# Patient Record
Sex: FEMALE | Race: WHITE | NOT HISPANIC OR LATINO | Employment: FULL TIME | URBAN - METROPOLITAN AREA
[De-identification: names, ages, dates, MRNs, and addresses within clinical notes are randomized per-mention and may not be internally consistent; named-entity substitution may affect disease eponyms.]

---

## 2017-05-22 ENCOUNTER — OFFICE VISIT (OUTPATIENT)
Dept: ENDOCRINOLOGY | Facility: CLINIC | Age: 32
End: 2017-05-22

## 2017-05-22 VITALS
BODY MASS INDEX: 47.09 KG/M2 | OXYGEN SATURATION: 99 % | SYSTOLIC BLOOD PRESSURE: 145 MMHG | WEIGHT: 293 LBS | DIASTOLIC BLOOD PRESSURE: 84 MMHG | HEART RATE: 80 BPM | HEIGHT: 66 IN

## 2017-05-22 DIAGNOSIS — E66.01 MORBID OBESITY, UNSPECIFIED OBESITY TYPE (H): Primary | ICD-10-CM

## 2017-05-22 RX ORDER — PHENTERMINE HYDROCHLORIDE 37.5 MG/1
0.5 TABLET ORAL EVERY MORNING
Qty: 15 TABLET | Refills: 3 | Status: SHIPPED | OUTPATIENT
Start: 2017-05-22 | End: 2017-06-29

## 2017-05-22 RX ORDER — PNV NO.95/FERROUS FUM/FOLIC AC 28MG-0.8MG
TABLET ORAL
COMMUNITY
Start: 2016-04-18 | End: 2017-06-29

## 2017-05-22 NOTE — NURSING NOTE
"Chief Complaint   Patient presents with     Weight Problem     NMWM       Vitals:    05/22/17 0854   BP: 145/84   Pulse: 80   SpO2: 99%   Weight: (!) 362 lb 9.6 oz   Height: 5' 6\"       Body mass index is 58.53 kg/(m^2).    Dio Schmitt CMA                     "

## 2017-05-22 NOTE — LETTER
Date:May 24, 2017      Patient was self referred, no letter generated. Do not send.        [facility]

## 2017-05-22 NOTE — PATIENT INSTRUCTIONS
See Sarah Negron in 1 month for return MW  See dietitian in 1 month      MEDICATION STARTED AT THIS APPOINTMENT    We are starting Phentermine. Take one tablet in the morning.  Call the nurse at [phone] if you have any questions or concerns. (Do not stop taking it if you don't think it's working. For some people it works without them knowing it.)    Phentermine is being prescribed because you identified hunger as one of the main causes for your extra weight.      Our patients on Phentermine find that they:    >feel less hunger    >find it easier to push the plate away   >have an easier time eating less    For some of our patients, these feelings are very real and immediate. For other patients, the feelings are less obvious. They don't feel much of a change but find they've lost weight. Like all weight loss medications, Phentermine  works best when you help it work. This means:  1. Having less tempting high calorie (fattening) food around the house or office. (For people with strong cravings this is very important.)   2. Staying away from situations or people that may trigger your cravings .   3. Eating out only one time or less each week.  4. Eating your meals at a table with the TV or computer off.    Side-effects. Phentermine is generally well tolerated. The main side-effects we see are feelings of racing pulse or rapid heart beat. Some people can get an elevated blood pressure. Because of this we may have you come back within a week or so of starting the medication for a blood pressure check.         In order to get refills of this or any medication we prescribe you must be seen in the medical weight mgmt clinic every 2-3 months. Please have your pharmacy fax a refill request to [phone].
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
## 2017-05-22 NOTE — MR AVS SNAPSHOT
After Visit Summary   5/22/2017    Melody Ramos    MRN: [id number]           Patient Information     Date Of Birth          1985        Visit Information        Provider Department      5/22/2017 9:00 AM Sarah Negron PA-C [facility]        Today's Diagnoses     Morbid obesity, unspecified obesity type (H)    -  1      Care Instructions    See Sarah Negron in 1 month for return MWM  See dietitian in 1 month      MEDICATION STARTED AT THIS APPOINTMENT    We are starting Phentermine. Take one tablet in the morning.  Call the nurse at [phone] if you have any questions or concerns. (Do not stop taking it if you don't think it's working. For some people it works without them knowing it.)    Phentermine is being prescribed because you identified hunger as one of the main causes for your extra weight.      Our patients on Phentermine find that they:    >feel less hunger    >find it easier to push the plate away   >have an easier time eating less    For some of our patients, these feelings are very real and immediate. For other patients, the feelings are less obvious. They don't feel much of a change but find they've lost weight. Like all weight loss medications, Phentermine  works best when you help it work. This means:  1. Having less tempting high calorie (fattening) food around the house or office. (For people with strong cravings this is very important.)   2. Staying away from situations or people that may trigger your cravings .   3. Eating out only one time or less each week.  4. Eating your meals at a table with the TV or computer off.    Side-effects. Phentermine is generally well tolerated. The main side-effects we see are feelings of racing pulse or rapid heart beat. Some people can get an elevated blood pressure. Because of this we may have you come back within a week or so of starting the medication for a blood pressure check.         In order to  "get refills of this or any medication we prescribe you must be seen in the medical weight mgmt clinic every 2-3 months. Please have your pharmacy fax a refill request to [phone].            Follow-ups after your visit        Follow-up notes from your care team     Return in 4 weeks (on 6/19/2017).      Who to contact     Please call your clinic at [phone] to:    Ask questions about your health    Make or cancel appointments    Discuss your medicines    Learn about your test results    Speak to your doctor   If you have compliments or concerns about an experience at your clinic, or if you wish to file a complaint, please contact [facility] Patient Relations at [phone] or email us at [email or web address]         Additional Information About Your Visit        [employer] Information     [employer] gives you secure access to your electronic health record. If you see a primary care provider, you can also send messages to your care team and make appointments. If you have questions, please call your primary care clinic.  If you do not have a primary care provider, please call [phone] and they will assist you.      [employer] is an electronic gateway that provides easy, online access to your medical records. With [employer], you can request a clinic appointment, read your test results, renew a prescription or communicate with your care team.     To access your existing account, please contact your [facility] or call [phone] for assistance.        Care EveryWhere ID     This is your Care EveryWhere ID. This could be used by other organizations to access your [place] medical records  [id number]        Your Vitals Were     Pulse Height Pulse Oximetry BMI (Body Mass Index)          80 5' 6\" 99% 58.53 kg/m2         Blood Pressure from Last 3 Encounters:   05/22/17 145/84   12/21/15 141/83   11/05/15 138/55    Weight from Last 3 Encounters: "   05/22/17 (!) 362 lb 9.6 oz   12/21/15 (!) 348 lb   11/05/15 (!) 340 lb              Today, you had the following     No orders found for display         Today's Medication Changes          These changes are accurate as of: 5/22/17  9:42 AM.  If you have any questions, ask your nurse or doctor.               Start taking these medicines.        Dose/Directions    phentermine 37.5 MG tablet   Commonly known as:  ADIPEX-P   Used for:  Morbid obesity, unspecified obesity type (H)   Started by:  Sarah Negron PA-C        Dose:  0.5 tablet   Take 0.5 tablets (18.75 mg) by mouth every morning   Quantity:  15 tablet   Refills:  3         Stop taking these medicines if you haven't already. Please contact your care team if you have questions.     ORTHO TRI-CYCLEN (28) 0.18/0.215/0.25 MG-35 MCG per tablet   Generic drug:  norgestim-eth estrad triphasic   Stopped by:  Sarah Negron PA-C                Where to get your medicines      Some of these will need a paper prescription and others can be bought over the counter.  Ask your nurse if you have questions.     Bring a paper prescription for each of these medications     phentermine 37.5 MG tablet                Primary Care Provider    None       No address on file        Thank you!     Thank you for choosing [facility]  for your care. Our goal is always to provide you with excellent care. Hearing back from our patients is one way we can continue to improve our services. Please take a few minutes to complete the written survey that you may receive in the mail after your visit with us. Thank you!             Your Updated Medication List - Protect others around you: Learn how to safely use, store and throw away your medicines at www.disposemymeds.org.          This list is accurate as of: 5/22/17  9:42 AM.  Always use your most recent med list.                   Brand Name Dispense Instructions for use    ascorbic acid 1000 MG  Tabs tablet      Take 4,000 mg by mouth       citalopram 20 MG tablet    celeXA    90 tablet    Take 1 tablet (20 mg) by mouth daily       metFORMIN 500 MG tablet    GLUCOPHAGE         phentermine 37.5 MG tablet    ADIPEX-P    15 tablet    Take 0.5 tablets (18.75 mg) by mouth every morning       Prenatal Vitamins 28-0.8 MG Tabs          progesterone 200 MG capsule    PROMETRIUM         vitamin D3 1000 UNITS Caps
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
## 2017-06-29 ENCOUNTER — ALLIED HEALTH/NURSE VISIT (OUTPATIENT)
Dept: SURGERY | Facility: CLINIC | Age: 32
End: 2017-06-29

## 2017-06-29 ENCOUNTER — OFFICE VISIT (OUTPATIENT)
Dept: ENDOCRINOLOGY | Facility: CLINIC | Age: 32
End: 2017-06-29

## 2017-06-29 VITALS
DIASTOLIC BLOOD PRESSURE: 86 MMHG | WEIGHT: 293 LBS | OXYGEN SATURATION: 98 % | HEIGHT: 66 IN | SYSTOLIC BLOOD PRESSURE: 136 MMHG | BODY MASS INDEX: 47.09 KG/M2 | HEART RATE: 80 BPM

## 2017-06-29 DIAGNOSIS — E66.01 MORBID OBESITY, UNSPECIFIED OBESITY TYPE (H): ICD-10-CM

## 2017-06-29 RX ORDER — PHENTERMINE HYDROCHLORIDE 37.5 MG/1
37.5 TABLET ORAL
Qty: 30 TABLET | Refills: 1 | Status: SHIPPED | OUTPATIENT
Start: 2017-06-29 | End: 2017-08-04

## 2017-06-29 RX ORDER — MEDROXYPROGESTERONE ACETATE 10 MG
TABLET ORAL
Refills: 10 | Status: ON HOLD | COMMUNITY
Start: 2017-04-26 | End: 2023-06-13

## 2017-06-29 ASSESSMENT — ENCOUNTER SYMPTOMS
SLEEP DISTURBANCES DUE TO BREATHING: 0
MUSCLE WEAKNESS: 0
BLOATING: 0
DOUBLE VISION: 0
DECREASED APPETITE: 0
LOSS OF CONSCIOUSNESS: 0
SHORTNESS OF BREATH: 0
CONSTIPATION: 0
WEIGHT GAIN: 0
FATIGUE: 0
HYPOTENSION: 0
TASTE DISTURBANCE: 0
FLANK PAIN: 0
INSOMNIA: 0
WHEEZING: 0
BREAST PAIN: 0
RESPIRATORY PAIN: 0
DECREASED LIBIDO: 0
EXERCISE INTOLERANCE: 0
NAIL CHANGES: 0
ORTHOPNEA: 0
DIZZINESS: 0
EYE WATERING: 0
ARTHRALGIAS: 0
TROUBLE SWALLOWING: 0
HYPERTENSION: 0
HEMOPTYSIS: 0
JAUNDICE: 0
LIGHT-HEADEDNESS: 0
HOARSE VOICE: 0
HEMATURIA: 0
NECK MASS: 0
COUGH DISTURBING SLEEP: 0
VOMITING: 0
NIGHT SWEATS: 0
DIFFICULTY URINATING: 0
SNORES LOUDLY: 0
NERVOUS/ANXIOUS: 0
STIFFNESS: 0
EYE PAIN: 0
TACHYCARDIA: 0
EYE IRRITATION: 0
BACK PAIN: 0
TREMORS: 0
HOT FLASHES: 0
BLOOD IN STOOL: 0
MUSCLE CRAMPS: 0
SPUTUM PRODUCTION: 0
SPEECH CHANGE: 0
DEPRESSION: 0
POLYDIPSIA: 0
NECK PAIN: 0
JOINT SWELLING: 0
FEVER: 0
SMELL DISTURBANCE: 0
SINUS CONGESTION: 0
ABDOMINAL PAIN: 0
POOR WOUND HEALING: 0
PALPITATIONS: 0
CHILLS: 0
NUMBNESS: 0
SEIZURES: 0
TINGLING: 0
COUGH: 0
EXTREMITY NUMBNESS: 0
POSTURAL DYSPNEA: 0
MEMORY LOSS: 0
RECTAL PAIN: 0
WEIGHT LOSS: 0
CLAUDICATION: 0
PARALYSIS: 0
SINUS PAIN: 0
SKIN CHANGES: 0
SYNCOPE: 0
BREAST MASS: 0
MYALGIAS: 0
HALLUCINATIONS: 0
SORE THROAT: 0
LEG SWELLING: 0
BOWEL INCONTINENCE: 0
WEAKNESS: 0
RECTAL BLEEDING: 0
ALTERED TEMPERATURE REGULATION: 0
SWOLLEN GLANDS: 0
BRUISES/BLEEDS EASILY: 0
LEG PAIN: 0
PANIC: 0
INCREASED ENERGY: 0
DIARRHEA: 0
EYE REDNESS: 0
HEADACHES: 0
POLYPHAGIA: 0
DYSURIA: 0
NAUSEA: 0
DISTURBANCES IN COORDINATION: 0
DYSPNEA ON EXERTION: 0
DECREASED CONCENTRATION: 0
HEARTBURN: 0

## 2017-06-29 NOTE — LETTER
Date:June 30, 2017      Patient was self referred, no letter generated. Do not send.        [facility] Health Information

## 2017-06-29 NOTE — MR AVS SNAPSHOT
After Visit Summary   6/29/2017    Melody Ramos    MRN: [id number]           Patient Information     Date Of Birth          1985        Visit Information        Provider Department      6/29/2017 3:30 PM Sarah Negron PA-C [facility]        Today's Diagnoses     Morbid obesity, unspecified obesity type (H)          Care Instructions    Increase phentermine to full tab 37.5mg  Blood pressure check in 1 week    See Sarah Nergon in 1-2 months  See dietitian same day if you would like          Follow-ups after your visit        Your next 10 appointments already scheduled     Jun 29, 2017  3:30 PM CDT   (Arrive by 3:15 PM)   Return Weight Management Visit with [facility] ([facility])    [address] [place] [id number] [phone]              Who to contact     Please call your clinic at [phone] to:    Ask questions about your health    Make or cancel appointments    Discuss your medicines    Learn about your test results    Speak to your doctor   If you have compliments or concerns about an experience at your clinic, or if you wish to file a complaint, please contact [facility] Patient Relations at [phone] or email us at [email or web address]         Additional Information About Your Visit        MyChart Information     [employer] gives you secure access to your electronic health record. If you see a primary care provider, you can also send messages to your care team and make appointments. If you have questions, please call your primary care clinic.  If you do not have a primary care provider, please call [phone] and they will assist you.      [employer] is an electronic gateway that provides easy, online access to your medical records. With [facility], you can request a clinic appointment, read your test  "results, renew a prescription or communicate with your care team.     To access your existing account, please contact your [facility] or call [phone] for assistance.        Care EveryWhere ID     This is your Care EveryWhere ID. This could be used by other organizations to access your [facility] medical records  [id number]        Your Vitals Were     Pulse Height Pulse Oximetry BMI (Body Mass Index)          80 5' 6\" 98% 58.61 kg/m2         Blood Pressure from Last 3 Encounters:   06/29/17 136/86   05/22/17 145/84   12/21/15 141/83    Weight from Last 3 Encounters:   06/29/17 (!) 363 lb 1.6 oz   05/22/17 (!) 362 lb 9.6 oz   12/21/15 (!) 348 lb              Today, you had the following     No orders found for display         Today's Medication Changes          These changes are accurate as of: 6/29/17  3:21 PM.  If you have any questions, ask your nurse or doctor.               These medicines have changed or have updated prescriptions.        Dose/Directions    phentermine 37.5 MG tablet   Commonly known as:  ADIPEX-P   This may have changed:    - how much to take  - when to take this   Used for:  Morbid obesity, unspecified obesity type (H)   Changed by:  Sarah Negron PA-C        Dose:  37.5 mg   Take 1 tablet (37.5 mg) by mouth every morning (before breakfast)   Quantity:  30 tablet   Refills:  1         Stop taking these medicines if you haven't already. Please contact your care team if you have questions.     Prenatal Vitamins 28-0.8 MG Tabs   Stopped by:  Sarah Negron PA-C           progesterone 200 MG capsule   Commonly known as:  PROMETRIUM   Stopped by:  Sarah Negron PA-C                Where to get your medicines      Some of these will need a paper prescription and others can be bought over the counter.  Ask your nurse if you have questions.     Bring a paper prescription for each of these medications     phentermine 37.5 MG tablet    "             Primary Care Provider    None       No address on file        Equal Access to Services     SHANON LEDBETTER : Hadii aad ku hadreinaaristides Ruthdawnali, louloudeangelo sanchezkellyha, brandianna carballokjhunter adenemilykieran churchill. So [facility] [phone].    ATENCIÓN: Si habla español, tiene a cabrera disposición servicios gratuitos de asistencia lingüística. [facility] [phone].    We comply with applicable federal civil rights laws and [place] laws. We do not discriminate on the basis of race, color, national origin, age, disability sex, sexual orientation or gender identity.            Thank you!     Thank you for choosing [facility] WEIGHT MANAGEMENT  for your care. Our goal is always to provide you with excellent care. Hearing back from our patients is one way we can continue to improve our services. Please take a few minutes to complete the written survey that you may receive in the mail after your visit with us. Thank you!             Your Updated Medication List - Protect others around you: Learn how to safely use, store and throw away your medicines at www.disposemymeds.org.          This list is accurate as of: 6/29/17  3:21 PM.  Always use your most recent med list.                   Brand Name Dispense Instructions for use Diagnosis    ascorbic acid 1000 MG Tabs tablet      Take 4,000 mg by mouth        citalopram 20 MG tablet    celeXA    90 tablet    Take 1 tablet (20 mg) by mouth daily    Anxiety       medroxyPROGESTERone 10 MG tablet    PROVERA     TK 1 T PO D FOR 10 DAYS        metFORMIN 500 MG tablet    GLUCOPHAGE          phentermine 37.5 MG tablet    ADIPEX-P    30 tablet    Take 1 tablet (37.5 mg) by mouth every morning (before breakfast)    Morbid obesity, unspecified obesity type (H)       vitamin D3 1000 UNITS Caps

## 2017-06-29 NOTE — LETTER
"2017       RE: Melody Ramos  [address] [facility] [address]     Dear Colleague,    Thank you for referring your patient, Melody Ramos, to the [facility] MEDICAL WEIGHT MANAGEMENT at [facility]. Please see a copy of my visit note below.    Return Medical Weight Management Note     Melody Ramos  MRN:  [id number]  :  1985  RIKY:  2017    Dear None,    I had the pleasure of seeing your patient Melody Ramos.  She is a 31 year old female who I am continuing to see for treatment of obesity related to:       2017   I have the following co-morbidities associated with obesity: Sleep Apnea, Infertility       INTERVAL HISTORY:  31 y.o. Female with morbid obesity and infertility who is here for [facility] follow up.  We started phentermine last visit.  She is up one pound from last month. She is undergoing evaluation currently for infertility.          CURRENT WEIGHT:   363 lbs 1.6 oz    Wt Readings from Last 4 Encounters:   17 (!) 363 lb 1.6 oz   17 (!) 362 lb 9.6 oz   12/21/15 (!) 348 lb   11/05/15 (!) 340 lb       Height:  5' 6\"  Body Mass Index:  Body mass index is 58.61 kg/(m^2).  Vitals:  /86  Pulse 80  Ht 5' 6\"  Wt (!) 363 lb 1.6 oz  SpO2 98%  BMI 58.61 kg/m2    Initial consult weight was 362 on 17.  Weight change since last seen on 2017 is up 1 pounds.   Total gain is 1 pounds.    Diet and Activity Changes Since Last Visit Reviewed With Patient 6/15/2017   I have made the following changes to my diet since my last visit: nothing   With regards to my diet, I am still struggling with: being busy, fast food, breakfast   For breakfast, I typically eat: nothing or fast food   For lunch, I typically eat: salad, sandwich   For supper, I typically eat: chicken, turkey, beef and a vegetable   For snack(s), I typically eat: nothing   I have made the following changes to my activity/exercise since my last visit: have not " worked out much this month   With regards to my activity/exercise, I am still struggling with: nothing       Review of Systems     Constitutional:  Negative for fever, chills, weight loss, weight gain, fatigue, decreased appetite, night sweats, recent stressors, height gain, height loss, post-operative complications, incisional pain, hallucinations, increased energy, hyperactivity and confused.   HENT:  Negative for ear pain, hearing loss, tinnitus, nosebleeds, trouble swallowing, hoarse voice, mouth sores, sore throat, ear discharge, tooth pain, gum tenderness, taste disturbance, smell disturbance, hearing aid, bleeding gums, dry mouth, sinus pain, sinus congestion and neck mass.    Eyes:  Negative for double vision, pain, redness, eye pain, decreased vision, eye watering, eye bulging, eye dryness, flashing lights, spots, floaters, strabismus, tunnel vision, jaundice and eye irritation.   Respiratory:   Negative for cough, hemoptysis, sputum production, shortness of breath, wheezing, sleep disturbances due to breathing, snores loudly, respiratory pain, dyspnea on exertion, cough disturbing sleep and postural dyspnea.    Cardiovascular:  Negative for chest pain, dyspnea on exertion, palpitations, orthopnea, claudication, leg swelling, fingers/toes turn blue, hypertension, hypotension, syncope, history of heart murmur, chest pain on exertion, chest pain at rest, pacemaker, few scattered varicosities, leg pain, sleep disturbances due to breathing, tachycardia, light-headedness, exercise intolerance and edema.   Gastrointestinal:  Negative for heartburn, nausea, vomiting, abdominal pain, diarrhea, constipation, blood in stool, melena, rectal pain, bloating, hemorrhoids, bowel incontinence, jaundice, rectal bleeding, coffee ground emesis and change in stool.   Genitourinary:  Negative for bladder incontinence, dysuria, urgency, hematuria, flank pain, vaginal discharge, difficulty urinating, genital sores, dyspareunia,  decreased libido, nocturia, voiding less frequently, arousal difficulty, abnormal vaginal bleeding, excessive menstruation, menstrual changes, hot flashes, vaginal dryness and postmenopausal bleeding.   Musculoskeletal:  Negative for myalgias, back pain, joint swelling, arthralgias, stiffness, muscle cramps, neck pain, bone pain, muscle weakness and fracture.   Skin:  Negative for nail changes, itching, poor wound healing, rash, hair changes, skin changes, acne, warts, poor wound healing, scarring, flaky skin, Raynaud's phenomenon, sensitivity to sunlight and skin thickening.   Neurological:  Negative for dizziness, tingling, tremors, speech change, seizures, loss of consciousness, weakness, light-headedness, numbness, headaches, disturbances in coordination, extremity numbness, memory loss, difficulty walking and paralysis.   Endo/Heme:  Negative for anemia, swollen glands and bruises/bleeds easily.   Psychiatric/Behavioral:  Negative for depression, hallucinations, memory loss, decreased concentration, mood swings and panic attacks.    Breast:  Negative for breast discharge, breast mass, breast pain and nipple retraction.   Endocrine:  Negative for altered temperature regulation, polyphagia, polydipsia, unwanted hair growth and change in facial hair.      MEDICATIONS:   Current Outpatient Prescriptions   Medication     medroxyPROGESTERone (PROVERA) 10 MG tablet     metFORMIN (GLUCOPHAGE) 500 MG tablet     ascorbic acid 1000 MG TABS tablet     Cholecalciferol (VITAMIN D3) 1000 UNITS CAPS     phentermine (ADIPEX-P) 37.5 MG tablet     citalopram (CELEXA) 20 MG tablet     No current facility-administered medications for this visit.        Weight Loss Medication History Reviewed With Patient 6/15/2017   Which weight loss medications are you currently taking on a regular basis?  Phentermine   Are you having any side effects from the weight loss medication that we have prescribed you? No       ASSESSMENT:   31 y.o.  Female with morbid obesity and infertility who is here for [facility] follow up.  We started phentermine last visit and she is tolerating 18.75mg daily.  She would like to increase dose.  She is seeing infertility clinic and diagnosed with PCOS.  She is taking metformin.    PLAN:   Increase phentermine 37.5 mg   Get back into exercise routine.      FOLLOW-UP:    1-2 months    Time: 10 min spent on evaluation, management, counseling, education, & motivational interviewing with greater than 50 % of the total time was spent on counseling and coordinating care    Sincerely,    Sarah Negron PA-C    Again, thank you for allowing me to participate in the care of your patient.      Sincerely,    Sarah Negron PA-C

## 2017-06-29 NOTE — PATIENT INSTRUCTIONS
Increase phentermine to full tab 37.5mg  Blood pressure check in 1 week    See Sarah Negron in 1-2 months  See dietitian same day if you would like

## 2017-06-29 NOTE — PROGRESS NOTES
"New Medical Weight Management Nutrition Consult    Melody Ramos is a 31 year old female presenting today for new weight management nutrition consultation. Pt referred by MERVIN Alexis (6/29/17).    Anthropometrics:   Initial Estimated body mass index is 58.61 kg/(m^2) as calculated from the following:    Height as of an earlier encounter on 6/29/17: 1.676 m (5' 6\").    Initial Weight as of an earlier encounter on 6/29/17: 363.1 lbs    Nutrition History  See MD note for details. Pt exercises at [facility] exercise program.     Nutrition Prescription  2,000 calories/day (per MD)    Nutrition Diagnosis  Obesity r/t long history of self-monitoring deficit and excessive energy intake aeb BMI >30.    Nutrition Intervention  Materials/education provided on 2,000 calorie/day diet with portion control and healthy food choices and tracking intake. Gave encouragement and support. Provided pt with \"The Plate Method\" handout, \"Sources of Protein\" handout, list of goals, and RD contact information.     Patient Understanding: Good  Expected Compliance: Good    Nutrition Goals  1) Follow a 2,000 calorie/day. Track intake on My[employer]itnessPal.  2) Eat 3 meals per day. Okay to have a protein bar/shake on exercise days (before/after exercise) - 200 calories or less, at least 20 grams protein, less than 10 grams sugar.   3) Continue [facility] program 4 days/week.     Follow-Up:  PRN    Time spent with patient: 30 minutes    Abbie Llamas RD, LD  Pager: [phone]           "

## 2017-06-29 NOTE — PATIENT INSTRUCTIONS
Nutrition Goals  1) Follow a 2,000 calorie/day. Track intake on My[employer]itnessPal.  2) Eat 3 meals per day. Okay to have a protein bar/shake on exercise days (before/after exercise) - 200 calories or less, at least 20 grams protein, less than 10 grams sugar.   3) Continue [facility] program 4 days/week.     Dianna Llamas RD, LD  [phone]

## 2017-06-29 NOTE — NURSING NOTE
"Chief Complaint   Patient presents with     Weight Problem     RMWM       Vitals:    06/29/17 1440   BP: 136/86   Pulse: 80   SpO2: 98%   Weight: (!) 363 lb 1.6 oz   Height: 5' 6\"       Body mass index is 58.61 kg/(m^2).    Dio Schmitt CMA                          "

## 2017-06-29 NOTE — MR AVS SNAPSHOT
MRN:[id number]                      After Visit Summary   6/29/2017    Melody Ramos    MRN: [id number]           Visit Information        Provider Department      6/29/2017 2:30 PM Abbie Llamas RD [facility]        Your next 10 appointments already scheduled     Jun 29, 2017  3:30 PM CDT   (Arrive by 3:15 PM)   Return Weight Management Visit with MADAI [facility] ([facility])    [address] [place] [id number] [phone]              Care Instructions    Nutrition Goals  1) Follow a 2,000 calorie/day. Track intake on MyFitnessPal.  2) Eat 3 meals per day. Okay to have a protein bar/shake on exercise days (before/after exercise) - 200 calories or less, at least 20 grams protein, less than 10 grams sugar.   3) Continue [facility] program 4 days/week.     Dianna Llamas RD,   [phone]           [employer] Information     [employer] gives you secure access to your electronic health record. If you see a primary care provider, you can also send messages to your care team and make appointments. If you have questions, please call your primary care clinic.  If you do not have a primary care provider, please call [phone] and they will assist you.      [employer] is an electronic gateway that provides easy, online access to your medical records. With [employer], you can request a clinic appointment, read your test results, renew a prescription or communicate with your care team.     To access your existing account, please contact your [facility] or call [phone] for assistance.        Care EveryWhere ID     This is your Care EveryWhere ID. This could be used by other organizations to access your [facility] medical records  [id number]        Equal Access to Services     BRADLEY LEDBETTER AH: prince [place],  [place] ah. So [facility] [phone].    ATENCIÓN: Si habla español, tiene a cabrera disposición servicios gratuitos de asistencia lingüística. Llame al [phone].    We comply with applicable federal civil rights laws and [place] laws. We do not discriminate on the basis of race, color, national origin, age, disability sex, sexual orientation or gender identity.

## 2017-06-29 NOTE — PROGRESS NOTES
"Return Medical Weight Management Note     Melody Ramos  MRN:  [id number]  :  1985  RIKY:  2017    Dear None,    I had the pleasure of seeing your patient Melody Ramos.  She is a 31 year old female who I am continuing to see for treatment of obesity related to:       2017   I have the following co-morbidities associated with obesity: Sleep Apnea, Infertility       INTERVAL HISTORY:  31 y.o. Female with morbid obesity and infertility who is here for [facility] follow up.  We started phentermine last visit.  She is up one pound from last month. She is undergoing evaluation currently for infertility.          CURRENT WEIGHT:   363 lbs 1.6 oz    Wt Readings from Last 4 Encounters:   17 (!) 363 lb 1.6 oz   17 (!) 362 lb 9.6 oz   12/21/15 (!) 348 lb   11/05/15 (!) 340 lb       Height:  5' 6\"  Body Mass Index:  Body mass index is 58.61 kg/(m^2).  Vitals:  /86  Pulse 80  Ht 5' 6\"  Wt (!) 363 lb 1.6 oz  SpO2 98%  BMI 58.61 kg/m2    Initial consult weight was 362 on 17.  Weight change since last seen on 2017 is up 1 pounds.   Total gain is 1 pounds.    Diet and Activity Changes Since Last Visit Reviewed With Patient 6/15/2017   I have made the following changes to my diet since my last visit: nothing   With regards to my diet, I am still struggling with: being busy, fast food, breakfast   For breakfast, I typically eat: nothing or fast food   For lunch, I typically eat: salad, sandwich   For supper, I typically eat: chicken, turkey, beef and a vegetable   For snack(s), I typically eat: nothing   I have made the following changes to my activity/exercise since my last visit: have not worked out much this month   With regards to my activity/exercise, I am still struggling with: nothing       Review of Systems     Constitutional:  Negative for fever, chills, weight loss, weight gain, fatigue, decreased appetite, night sweats, recent stressors, height gain, height loss, " post-operative complications, incisional pain, hallucinations, increased energy, hyperactivity and confused.   HENT:  Negative for ear pain, hearing loss, tinnitus, nosebleeds, trouble swallowing, hoarse voice, mouth sores, sore throat, ear discharge, tooth pain, gum tenderness, taste disturbance, smell disturbance, hearing aid, bleeding gums, dry mouth, sinus pain, sinus congestion and neck mass.    Eyes:  Negative for double vision, pain, redness, eye pain, decreased vision, eye watering, eye bulging, eye dryness, flashing lights, spots, floaters, strabismus, tunnel vision, jaundice and eye irritation.   Respiratory:   Negative for cough, hemoptysis, sputum production, shortness of breath, wheezing, sleep disturbances due to breathing, snores loudly, respiratory pain, dyspnea on exertion, cough disturbing sleep and postural dyspnea.    Cardiovascular:  Negative for chest pain, dyspnea on exertion, palpitations, orthopnea, claudication, leg swelling, fingers/toes turn blue, hypertension, hypotension, syncope, history of heart murmur, chest pain on exertion, chest pain at rest, pacemaker, few scattered varicosities, leg pain, sleep disturbances due to breathing, tachycardia, light-headedness, exercise intolerance and edema.   Gastrointestinal:  Negative for heartburn, nausea, vomiting, abdominal pain, diarrhea, constipation, blood in stool, melena, rectal pain, bloating, hemorrhoids, bowel incontinence, jaundice, rectal bleeding, coffee ground emesis and change in stool.   Genitourinary:  Negative for bladder incontinence, dysuria, urgency, hematuria, flank pain, vaginal discharge, difficulty urinating, genital sores, dyspareunia, decreased libido, nocturia, voiding less frequently, arousal difficulty, abnormal vaginal bleeding, excessive menstruation, menstrual changes, hot flashes, vaginal dryness and postmenopausal bleeding.   Musculoskeletal:  Negative for myalgias, back pain, joint swelling, arthralgias,  stiffness, muscle cramps, neck pain, bone pain, muscle weakness and fracture.   Skin:  Negative for nail changes, itching, poor wound healing, rash, hair changes, skin changes, acne, warts, poor wound healing, scarring, flaky skin, Raynaud's phenomenon, sensitivity to sunlight and skin thickening.   Neurological:  Negative for dizziness, tingling, tremors, speech change, seizures, loss of consciousness, weakness, light-headedness, numbness, headaches, disturbances in coordination, extremity numbness, memory loss, difficulty walking and paralysis.   Endo/Heme:  Negative for anemia, swollen glands and bruises/bleeds easily.   Psychiatric/Behavioral:  Negative for depression, hallucinations, memory loss, decreased concentration, mood swings and panic attacks.    Breast:  Negative for breast discharge, breast mass, breast pain and nipple retraction.   Endocrine:  Negative for altered temperature regulation, polyphagia, polydipsia, unwanted hair growth and change in facial hair.      MEDICATIONS:   Current Outpatient Prescriptions   Medication     medroxyPROGESTERone (PROVERA) 10 MG tablet     metFORMIN (GLUCOPHAGE) 500 MG tablet     ascorbic acid 1000 MG TABS tablet     Cholecalciferol (VITAMIN D3) 1000 UNITS CAPS     phentermine (ADIPEX-P) 37.5 MG tablet     citalopram (CELEXA) 20 MG tablet     No current facility-administered medications for this visit.        Weight Loss Medication History Reviewed With Patient 6/15/2017   Which weight loss medications are you currently taking on a regular basis?  Phentermine   Are you having any side effects from the weight loss medication that we have prescribed you? No       ASSESSMENT:   31 y.o. Female with morbid obesity and infertility who is here for [facility] follow up.  We started phentermine last visit and she is tolerating 18.75mg daily.  She would like to increase dose.  She is seeing infertility clinic and diagnosed with PCOS.  She is taking metformin.    PLAN:   Increase  phentermine 37.5 mg   Get back into exercise routine.      FOLLOW-UP:    1-2 months    Time: 10 min spent on evaluation, management, counseling, education, & motivational interviewing with greater than 50 % of the total time was spent on counseling and coordinating care    Sincerely,    Sarah Negron PA-C

## 2017-08-04 ENCOUNTER — OFFICE VISIT (OUTPATIENT)
Dept: ENDOCRINOLOGY | Facility: CLINIC | Age: 32
End: 2017-08-04

## 2017-08-04 VITALS
SYSTOLIC BLOOD PRESSURE: 142 MMHG | BODY MASS INDEX: 47.09 KG/M2 | WEIGHT: 293 LBS | DIASTOLIC BLOOD PRESSURE: 89 MMHG | OXYGEN SATURATION: 99 % | HEIGHT: 66 IN | HEART RATE: 67 BPM

## 2017-08-04 DIAGNOSIS — E66.01 MORBID OBESITY, UNSPECIFIED OBESITY TYPE (H): ICD-10-CM

## 2017-08-04 RX ORDER — PHENTERMINE HYDROCHLORIDE 37.5 MG/1
37.5 TABLET ORAL
Qty: 30 TABLET | Refills: 3 | Status: ON HOLD | OUTPATIENT
Start: 2017-08-04 | End: 2023-06-13

## 2017-08-04 RX ORDER — PHENTERMINE HYDROCHLORIDE 37.5 MG/1
37.5 TABLET ORAL
Qty: 30 TABLET | Refills: 3 | Status: SHIPPED | OUTPATIENT
Start: 2017-08-04 | End: 2017-08-04

## 2017-08-04 ASSESSMENT — ENCOUNTER SYMPTOMS
HOARSE VOICE: 0
HALLUCINATIONS: 0
FATIGUE: 0
WEIGHT GAIN: 0
LEG PAIN: 0
CLAUDICATION: 0
RESPIRATORY PAIN: 0
BREAST MASS: 0
LOSS OF CONSCIOUSNESS: 0
NECK MASS: 0
SNORES LOUDLY: 0
TINGLING: 0
BLOATING: 0
COUGH: 0
ABDOMINAL PAIN: 0
POSTURAL DYSPNEA: 0
NIGHT SWEATS: 0
DYSPNEA ON EXERTION: 0
SKIN CHANGES: 0
SHORTNESS OF BREATH: 0
HYPERTENSION: 0
MEMORY LOSS: 0
SORE THROAT: 0
LEG SWELLING: 0
JAUNDICE: 0
HEARTBURN: 0
SPEECH CHANGE: 0
NERVOUS/ANXIOUS: 0
SYNCOPE: 0
RECTAL PAIN: 0
HYPOTENSION: 0
SINUS PAIN: 0
BLOOD IN STOOL: 0
WEAKNESS: 0
EYE IRRITATION: 0
DIFFICULTY URINATING: 0
WEIGHT LOSS: 0
PALPITATIONS: 0
SINUS CONGESTION: 0
SWOLLEN GLANDS: 0
EYE REDNESS: 0
ARTHRALGIAS: 0
PANIC: 0
JOINT SWELLING: 0
DEPRESSION: 0
MUSCLE CRAMPS: 0
NAUSEA: 0
CONSTIPATION: 0
NAIL CHANGES: 0
FEVER: 0
COUGH DISTURBING SLEEP: 0
POOR WOUND HEALING: 0
BREAST PAIN: 0
NECK PAIN: 0
TASTE DISTURBANCE: 0
SPUTUM PRODUCTION: 0
HEMATURIA: 0
NUMBNESS: 0
EXERCISE INTOLERANCE: 0
INCREASED ENERGY: 0
HOT FLASHES: 0
ALTERED TEMPERATURE REGULATION: 0
BACK PAIN: 0
SEIZURES: 0
VOMITING: 0
TROUBLE SWALLOWING: 0
DECREASED APPETITE: 0
WHEEZING: 0
TREMORS: 0
FLANK PAIN: 0
DOUBLE VISION: 0
BOWEL INCONTINENCE: 0
HEADACHES: 0
CHILLS: 0
DIZZINESS: 0
SLEEP DISTURBANCES DUE TO BREATHING: 0
EYE WATERING: 0
DECREASED LIBIDO: 0
DYSURIA: 0
PARALYSIS: 0
LIGHT-HEADEDNESS: 0
TACHYCARDIA: 0
ORTHOPNEA: 0
DECREASED CONCENTRATION: 0
POLYDIPSIA: 0
DISTURBANCES IN COORDINATION: 0
INSOMNIA: 0
MUSCLE WEAKNESS: 0
POLYPHAGIA: 0
EXTREMITY NUMBNESS: 0
STIFFNESS: 0
DIARRHEA: 0
HEMOPTYSIS: 0
EYE PAIN: 0
SMELL DISTURBANCE: 0
RECTAL BLEEDING: 0
MYALGIAS: 0
BRUISES/BLEEDS EASILY: 0

## 2017-08-04 NOTE — PROGRESS NOTES
"Return Medical Weight Management Note     Melody Ramos  MRN:  [id number]  :  1985  RIKY:  2017    Dear None,    I had the pleasure of seeing your patient Melody Ramos.  She is a 31 year old female who I am continuing to see for treatment of obesity related to:       2017   I have the following co-morbidities associated with obesity: Sleep Apnea, Infertility       INTERVAL HISTORY:  31 y.o. Female with morbid obesity and infertility who is here for [facility] follow up.  We started phentermine in May.  She is down 7 pounds from last month. She is undergoing evaluation currently for infertility.    CURRENT WEIGHT:   356 lbs 3.2 oz    Wt Readings from Last 4 Encounters:   17 (!) 356 lb 3.2 oz   17 (!) 363 lb 1.6 oz   17 (!) 362 lb 9.6 oz   12/21/15 (!) 348 lb       Height:  5' 6\"  Body Mass Index:  Body mass index is 57.49 kg/(m^2).  Vitals:  /89  Pulse 67  Ht 5' 6\"  Wt (!) 356 lb 3.2 oz  SpO2 99%  BMI 57.49 kg/m2      Initial consult weight was 362 on 17.  Weight change since last seen on 2017 is down 7 pounds.   Total loss is 6 pounds.    Diet and Activity Changes Since Last Visit Reviewed With Patient 6/15/2017   I have made the following changes to my diet since my last visit: nothing   With regards to my diet, I am still struggling with: being busy, fast food, breakfast   For breakfast, I typically eat: nothing or fast food   For lunch, I typically eat: salad, sandwich   For supper, I typically eat: chicken, turkey, beef and a vegetable   For snack(s), I typically eat: nothing   I have made the following changes to my activity/exercise since my last visit: have not worked out much this month   With regards to my activity/exercise, I am still struggling with: nothing       Review of Systems     Constitutional:  Negative for fever, chills, weight loss, weight gain, fatigue, decreased appetite, night sweats, recent stressors, height gain, height loss, " post-operative complications, incisional pain, hallucinations, increased energy, hyperactivity and confused.   HENT:  Negative for ear pain, hearing loss, tinnitus, nosebleeds, trouble swallowing, hoarse voice, mouth sores, sore throat, ear discharge, tooth pain, gum tenderness, taste disturbance, smell disturbance, hearing aid, bleeding gums, dry mouth, sinus pain, sinus congestion and neck mass.    Eyes:  Negative for double vision, pain, redness, eye pain, decreased vision, eye watering, eye bulging, eye dryness, flashing lights, spots, floaters, strabismus, tunnel vision, jaundice and eye irritation.   Respiratory:   Negative for cough, hemoptysis, sputum production, shortness of breath, wheezing, sleep disturbances due to breathing, snores loudly, respiratory pain, dyspnea on exertion, cough disturbing sleep and postural dyspnea.    Cardiovascular:  Negative for chest pain, dyspnea on exertion, palpitations, orthopnea, claudication, leg swelling, fingers/toes turn blue, hypertension, hypotension, syncope, history of heart murmur, chest pain on exertion, chest pain at rest, pacemaker, few scattered varicosities, leg pain, sleep disturbances due to breathing, tachycardia, light-headedness, exercise intolerance and edema.   Gastrointestinal:  Negative for heartburn, nausea, vomiting, abdominal pain, diarrhea, constipation, blood in stool, melena, rectal pain, bloating, hemorrhoids, bowel incontinence, jaundice, rectal bleeding, coffee ground emesis and change in stool.   Genitourinary:  Negative for bladder incontinence, dysuria, urgency, hematuria, flank pain, vaginal discharge, difficulty urinating, genital sores, dyspareunia, decreased libido, nocturia, voiding less frequently, arousal difficulty, abnormal vaginal bleeding, excessive menstruation, menstrual changes, hot flashes, vaginal dryness and postmenopausal bleeding.   Musculoskeletal:  Negative for myalgias, back pain, joint swelling, arthralgias,  stiffness, muscle cramps, neck pain, bone pain, muscle weakness and fracture.   Skin:  Negative for nail changes, itching, poor wound healing, rash, hair changes, skin changes, acne, warts, poor wound healing, scarring, flaky skin, Raynaud's phenomenon, sensitivity to sunlight and skin thickening.   Neurological:  Negative for dizziness, tingling, tremors, speech change, seizures, loss of consciousness, weakness, light-headedness, numbness, headaches, disturbances in coordination, extremity numbness, memory loss, difficulty walking and paralysis.   Endo/Heme:  Negative for anemia, swollen glands and bruises/bleeds easily.   Psychiatric/Behavioral:  Negative for depression, hallucinations, memory loss, decreased concentration, mood swings and panic attacks.    Breast:  Negative for breast discharge, breast mass, breast pain and nipple retraction.   Endocrine:  Negative for altered temperature regulation, polyphagia, polydipsia, unwanted hair growth and change in facial hair.      MEDICATIONS:   Current Outpatient Prescriptions   Medication     medroxyPROGESTERone (PROVERA) 10 MG tablet     phentermine (ADIPEX-P) 37.5 MG tablet     metFORMIN (GLUCOPHAGE) 500 MG tablet     ascorbic acid 1000 MG TABS tablet     Cholecalciferol (VITAMIN D3) 1000 UNITS CAPS     citalopram (CELEXA) 20 MG tablet     No current facility-administered medications for this visit.        Weight Loss Medication History Reviewed With Patient 7/31/2017   Which weight loss medications are you currently taking on a regular basis?  Phentermine   Are you having any side effects from the weight loss medication that we have prescribed you? No       ASSESSMENT:     31 y.o. Female here for [facility] follow up.  She has lost 6 lbs overall and 7 since last month.  She is tolerating 37.5mg phentermine and it is helping with hunger.    PLAN:   Continue phentermine 37.5mg daily      FOLLOW-UP:    12 weeks.    Time: 10 min spent on evaluation, management, counseling,  education, & motivational interviewing with greater than 50 % of the total time was spent on counseling and coordinating care    Sincerely,    Sarah Negron PA-C

## 2017-08-04 NOTE — LETTER
"2017       RE: Melody Ramos  [address] [facility] [address]     Dear Colleague,    Thank you for referring your patient, Melody Ramos, to the [facility] MEDICAL WEIGHT MANAGEMENT at [facility]. Please see a copy of my visit note below.    Return Medical Weight Management Note     Melody Ramos  MRN:  [id number]  :  1985  RIKY:  2017    Dear None,    I had the pleasure of seeing your patient Melody Ramos.  She is a 31 year old female who I am continuing to see for treatment of obesity related to:       2017   I have the following co-morbidities associated with obesity: Sleep Apnea, Infertility       INTERVAL HISTORY:  31 y.o. Female with morbid obesity and infertility who is here for [facility] follow up.  We started phentermine in May.  She is down 7 pounds from last month. She is undergoing evaluation currently for infertility.    CURRENT WEIGHT:   356 lbs 3.2 oz    Wt Readings from Last 4 Encounters:   17 (!) 356 lb 3.2 oz   17 (!) 363 lb 1.6 oz   17 (!) 362 lb 9.6 oz   12/21/15 (!) 348 lb       Height:  5' 6\"  Body Mass Index:  Body mass index is 57.49 kg/(m^2).  Vitals:  /89  Pulse 67  Ht 5' 6\"  Wt (!) 356 lb 3.2 oz  SpO2 99%  BMI 57.49 kg/m2      Initial consult weight was 362 on 17.  Weight change since last seen on 2017 is down 7 pounds.   Total loss is 6 pounds.    Diet and Activity Changes Since Last Visit Reviewed With Patient 6/15/2017   I have made the following changes to my diet since my last visit: nothing   With regards to my diet, I am still struggling with: being busy, fast food, breakfast   For breakfast, I typically eat: nothing or fast food   For lunch, I typically eat: salad, sandwich   For supper, I typically eat: chicken, turkey, beef and a vegetable   For snack(s), I typically eat: nothing   I have made the following changes to my activity/exercise since my last visit: have not " worked out much this month   With regards to my activity/exercise, I am still struggling with: nothing       Review of Systems     Constitutional:  Negative for fever, chills, weight loss, weight gain, fatigue, decreased appetite, night sweats, recent stressors, height gain, height loss, post-operative complications, incisional pain, hallucinations, increased energy, hyperactivity and confused.   HENT:  Negative for ear pain, hearing loss, tinnitus, nosebleeds, trouble swallowing, hoarse voice, mouth sores, sore throat, ear discharge, tooth pain, gum tenderness, taste disturbance, smell disturbance, hearing aid, bleeding gums, dry mouth, sinus pain, sinus congestion and neck mass.    Eyes:  Negative for double vision, pain, redness, eye pain, decreased vision, eye watering, eye bulging, eye dryness, flashing lights, spots, floaters, strabismus, tunnel vision, jaundice and eye irritation.   Respiratory:   Negative for cough, hemoptysis, sputum production, shortness of breath, wheezing, sleep disturbances due to breathing, snores loudly, respiratory pain, dyspnea on exertion, cough disturbing sleep and postural dyspnea.    Cardiovascular:  Negative for chest pain, dyspnea on exertion, palpitations, orthopnea, claudication, leg swelling, fingers/toes turn blue, hypertension, hypotension, syncope, history of heart murmur, chest pain on exertion, chest pain at rest, pacemaker, few scattered varicosities, leg pain, sleep disturbances due to breathing, tachycardia, light-headedness, exercise intolerance and edema.   Gastrointestinal:  Negative for heartburn, nausea, vomiting, abdominal pain, diarrhea, constipation, blood in stool, melena, rectal pain, bloating, hemorrhoids, bowel incontinence, jaundice, rectal bleeding, coffee ground emesis and change in stool.   Genitourinary:  Negative for bladder incontinence, dysuria, urgency, hematuria, flank pain, vaginal discharge, difficulty urinating, genital sores, dyspareunia,  decreased libido, nocturia, voiding less frequently, arousal difficulty, abnormal vaginal bleeding, excessive menstruation, menstrual changes, hot flashes, vaginal dryness and postmenopausal bleeding.   Musculoskeletal:  Negative for myalgias, back pain, joint swelling, arthralgias, stiffness, muscle cramps, neck pain, bone pain, muscle weakness and fracture.   Skin:  Negative for nail changes, itching, poor wound healing, rash, hair changes, skin changes, acne, warts, poor wound healing, scarring, flaky skin, Raynaud's phenomenon, sensitivity to sunlight and skin thickening.   Neurological:  Negative for dizziness, tingling, tremors, speech change, seizures, loss of consciousness, weakness, light-headedness, numbness, headaches, disturbances in coordination, extremity numbness, memory loss, difficulty walking and paralysis.   Endo/Heme:  Negative for anemia, swollen glands and bruises/bleeds easily.   Psychiatric/Behavioral:  Negative for depression, hallucinations, memory loss, decreased concentration, mood swings and panic attacks.    Breast:  Negative for breast discharge, breast mass, breast pain and nipple retraction.   Endocrine:  Negative for altered temperature regulation, polyphagia, polydipsia, unwanted hair growth and change in facial hair.      MEDICATIONS:   Current Outpatient Prescriptions   Medication     medroxyPROGESTERone (PROVERA) 10 MG tablet     phentermine (ADIPEX-P) 37.5 MG tablet     metFORMIN (GLUCOPHAGE) 500 MG tablet     ascorbic acid 1000 MG TABS tablet     Cholecalciferol (VITAMIN D3) 1000 UNITS CAPS     citalopram (CELEXA) 20 MG tablet     No current facility-administered medications for this visit.        Weight Loss Medication History Reviewed With Patient 7/31/2017   Which weight loss medications are you currently taking on a regular basis?  Phentermine   Are you having any side effects from the weight loss medication that we have prescribed you? No       ASSESSMENT:     31 y.o.  Female here for [facility] follow up.  She has lost 6 lbs overall and 7 since last month.  She is tolerating 37.5mg phentermine and it is helping with hunger.    PLAN:   Continue phentermine 37.5mg daily      FOLLOW-UP:    12 weeks.    Time: 10 min spent on evaluation, management, counseling, education, & motivational interviewing with greater than 50 % of the total time was spent on counseling and coordinating care    Sincerely,    Sarah Negron PA-C    Again, thank you for allowing me to participate in the care of your patient.      Sincerely,    Sarah Negron PA-C

## 2017-08-04 NOTE — NURSING NOTE
"Chief Complaint   Patient presents with     Weight Problem     RMWM       Vitals:    08/04/17 0828   BP: 142/89   Pulse: 67   SpO2: 99%   Weight: (!) 356 lb 3.2 oz   Height: 5' 6\"       Body mass index is 57.49 kg/(m^2).    Dio Schmitt CMA                          "

## 2017-08-04 NOTE — LETTER
Date:August 7, 2017      Patient was self referred, no letter generated. Do not send.        [facility] Health Information

## 2017-08-04 NOTE — MR AVS SNAPSHOT
After Visit Summary   8/4/2017    Melody Ramos    MRN: [id number]           Patient Information     Date Of Birth          1985        Visit Information        Provider Department      8/4/2017 8:45 AM Sarah Negron PA-C [facility] Medical Weight Management        Today's Diagnoses     Morbid obesity, unspecified obesity type (H)           Follow-ups after your visit        Your next 10 appointments already scheduled     Nov 13, 2017  9:00 AM CST   (Arrive by 8:45 AM)   Return Weight Management Visit with MADAI Pretty [facility] Medical Weight Management ([facility])    [address]  [address] [facility] [address] [phone]              Who to contact     Please call your clinic at [phone] to:    Ask questions about your health    Make or cancel appointments    Discuss your medicines    Learn about your test results    Speak to your doctor   If you have compliments or concerns about an experience at your clinic, or if you wish to file a complaint, please contact [facility] Patient Relations at [phone] or email us at [email or web address]         Additional Information About Your Visit        MyChart Information     [employer] gives you secure access to your electronic health record. If you see a primary care provider, you can also send messages to your care team and make appointments. If you have questions, please call your primary care clinic.  If you do not have a primary care provider, please call [phone] and they will assist you.      [employer] is an electronic gateway that provides easy, online access to your medical records. With [employer], you can request a clinic appointment, read your test results, renew a prescription or communicate with your care team.     To access your existing account, please contact your [facility] or call  "[phone] for assistance.        Care EveryWhere ID     This is your Care EveryWhere ID. This could be used by other organizations to access your [facility] medical records  [id number]        Your Vitals Were     Pulse Height Pulse Oximetry BMI (Body Mass Index)          67 1.676 m (5' 6\") 99% 57.49 kg/m2         Blood Pressure from Last 3 Encounters:   08/04/17 142/89   06/29/17 136/86   05/22/17 145/84    Weight from Last 3 Encounters:   08/04/17 (!) 161.6 kg (356 lb 3.2 oz)   06/29/17 (!) 164.7 kg (363 lb 1.6 oz)   05/22/17 (!) 164.5 kg (362 lb 9.6 oz)              Today, you had the following     No orders found for display         Today's Medication Changes          These changes are accurate as of: 8/4/17  8:57 AM.  If you have any questions, ask your nurse or doctor.               Start taking these medicines.        Dose/Directions    phentermine 37.5 MG tablet   Commonly known as:  ADIPEX-P   Used for:  Morbid obesity, unspecified obesity type (H)   Started by:  Sarah Negron PA-C        Dose:  37.5 mg   Take 1 tablet (37.5 mg) by mouth every morning (before breakfast)   Quantity:  30 tablet   Refills:  3            Where to get your medicines      Some of these will need a paper prescription and others can be bought over the counter.  Ask your nurse if you have questions.     Bring a paper prescription for each of these medications     phentermine 37.5 MG tablet                Primary Care Provider    None       No address on file        Equal Access to Services     BRADLEY LEDBETTER AH: Hadii james Casas, wadonna parikh, qaybta kahunter lim [facility] churchill. So [facility] [phone].    ATENCIÓN: Si habla español, tiene a cabrera disposición servicios gratuitos de asistencia lingüística. Llame al [phone].    We comply with applicable federal civil rights laws and [place] laws. We do not discriminate on the basis of race, color, national origin, age, " disability sex, sexual orientation or gender identity.            Thank you!     Thank you for choosing [facility] MEDICAL WEIGHT MANAGEMENT  for your care. Our goal is always to provide you with excellent care. Hearing back from our patients is one way we can continue to improve our services. Please take a few minutes to complete the written survey that you may receive in the mail after your visit with us. Thank you!             Your Updated Medication List - Protect others around you: Learn how to safely use, store and throw away your medicines at www.disposemymeds.org.          This list is accurate as of: 8/4/17  8:57 AM.  Always use your most recent med list.                   Brand Name Dispense Instructions for use Diagnosis    ascorbic acid 1000 MG Tabs tablet      Take 4,000 mg by mouth        citalopram 20 MG tablet    celeXA    90 tablet    Take 1 tablet (20 mg) by mouth daily    Anxiety       medroxyPROGESTERone 10 MG tablet    PROVERA     TK 1 T PO D FOR 10 DAYS        metFORMIN 500 MG tablet    GLUCOPHAGE          phentermine 37.5 MG tablet    ADIPEX-P    30 tablet    Take 1 tablet (37.5 mg) by mouth every morning (before breakfast)    Morbid obesity, unspecified obesity type (H)       vitamin D3 1000 UNITS Caps

## 2017-11-26 ENCOUNTER — HEALTH MAINTENANCE LETTER (OUTPATIENT)
Age: 32
End: 2017-11-26

## 2020-03-02 ENCOUNTER — HEALTH MAINTENANCE LETTER (OUTPATIENT)
Age: 35
End: 2020-03-02

## 2020-12-14 ENCOUNTER — HEALTH MAINTENANCE LETTER (OUTPATIENT)
Age: 35
End: 2020-12-14

## 2021-04-18 ENCOUNTER — HEALTH MAINTENANCE LETTER (OUTPATIENT)
Age: 36
End: 2021-04-18

## 2021-10-02 ENCOUNTER — HEALTH MAINTENANCE LETTER (OUTPATIENT)
Age: 36
End: 2021-10-02

## 2021-12-29 ENCOUNTER — LAB REQUISITION (OUTPATIENT)
Dept: LAB | Facility: CLINIC | Age: 36
End: 2021-12-29
Payer: COMMERCIAL

## 2021-12-29 DIAGNOSIS — U07.1 COVID-19: ICD-10-CM

## 2021-12-29 PROCEDURE — U0005 INFEC AGEN DETEC AMPLI PROBE: HCPCS | Mod: ORL | Performed by: FAMILY MEDICINE

## 2021-12-30 LAB — SARS-COV-2 RNA RESP QL NAA+PROBE: NEGATIVE

## 2022-01-13 ENCOUNTER — TELEPHONE (OUTPATIENT)
Dept: SLEEP MEDICINE | Facility: CLINIC | Age: 37
End: 2022-01-13
Payer: COMMERCIAL

## 2022-01-13 NOTE — TELEPHONE ENCOUNTER
Reason for call:  Other   Patient called regarding (reason for call):   Needs ACTUAL Sleep Study    Additional comments:   Needs ACTUAL Sleep Study for 2015.    Phone number to reach patient:  Other phone number:    Ness @  [facility]  ph [phone]  fax [phone]      Best Time:  any    Can we leave a detailed message on this number?  YES    Travel screening: Not Applicable

## 2022-02-16 ENCOUNTER — HOSPITAL ENCOUNTER (EMERGENCY)
Facility: CLINIC | Age: 37
Discharge: HOME | End: 2022-02-17
Attending: EMERGENCY MEDICINE
Payer: COMMERCIAL

## 2022-02-16 ENCOUNTER — APPOINTMENT (OUTPATIENT)
Dept: CT IMAGING | Facility: CLINIC | Age: 37
End: 2022-02-16
Attending: EMERGENCY MEDICINE
Payer: COMMERCIAL

## 2022-02-16 ENCOUNTER — HOSPITAL ENCOUNTER (EMERGENCY)
Facility: CLINIC | Age: 37
Discharge: ANOTHER HOSPITAL | End: 2022-02-16
Attending: EMERGENCY MEDICINE | Admitting: EMERGENCY MEDICINE
Payer: COMMERCIAL

## 2022-02-16 VITALS
DIASTOLIC BLOOD PRESSURE: 87 MMHG | SYSTOLIC BLOOD PRESSURE: 163 MMHG | TEMPERATURE: 97.1 F | OXYGEN SATURATION: 100 % | HEART RATE: 83 BPM | RESPIRATION RATE: 18 BRPM

## 2022-02-16 DIAGNOSIS — T15.90XA FOREIGN BODY IN EYE, UNSPECIFIED LATERALITY, INITIAL ENCOUNTER: ICD-10-CM

## 2022-02-16 DIAGNOSIS — T15.12XD: ICD-10-CM

## 2022-02-16 LAB
CREAT BLD-MCNC: 0.9 MG/DL (ref 0.5–1)
GFR SERPL CREATININE-BSD FRML MDRD: >60 ML/MIN/1.73M2
HCG UR QL: NEGATIVE
INTERNAL QC OK POCT: NORMAL
POCT KIT EXPIRATION DATE: NORMAL
POCT KIT LOT NUMBER: NORMAL

## 2022-02-16 PROCEDURE — 90471 IMMUNIZATION ADMIN: CPT | Performed by: EMERGENCY MEDICINE

## 2022-02-16 PROCEDURE — 99285 EMERGENCY DEPT VISIT HI MDM: CPT | Performed by: EMERGENCY MEDICINE

## 2022-02-16 PROCEDURE — 70481 CT ORBIT/EAR/FOSSA W/DYE: CPT

## 2022-02-16 PROCEDURE — 90715 TDAP VACCINE 7 YRS/> IM: CPT | Performed by: EMERGENCY MEDICINE

## 2022-02-16 PROCEDURE — 99285 EMERGENCY DEPT VISIT HI MDM: CPT | Mod: 25

## 2022-02-16 PROCEDURE — 250N000011 HC RX IP 250 OP 636: Performed by: EMERGENCY MEDICINE

## 2022-02-16 PROCEDURE — 99285 EMERGENCY DEPT VISIT HI MDM: CPT | Mod: 25 | Performed by: EMERGENCY MEDICINE

## 2022-02-16 PROCEDURE — 82565 ASSAY OF CREATININE: CPT

## 2022-02-16 PROCEDURE — 250N000009 HC RX 250: Performed by: EMERGENCY MEDICINE

## 2022-02-16 PROCEDURE — 81025 URINE PREGNANCY TEST: CPT | Performed by: EMERGENCY MEDICINE

## 2022-02-16 RX ORDER — TETRACAINE HYDROCHLORIDE 5 MG/ML
2 SOLUTION OPHTHALMIC ONCE
Status: DISCONTINUED | OUTPATIENT
Start: 2022-02-16 | End: 2022-02-16 | Stop reason: HOSPADM

## 2022-02-16 RX ORDER — IOPAMIDOL 755 MG/ML
100 INJECTION, SOLUTION INTRAVASCULAR ONCE
Status: COMPLETED | OUTPATIENT
Start: 2022-02-16 | End: 2022-02-16

## 2022-02-16 RX ADMIN — SODIUM CHLORIDE 50 ML: 9 INJECTION, SOLUTION INTRAVENOUS at 22:24

## 2022-02-16 RX ADMIN — IOPAMIDOL 50 ML: 755 INJECTION, SOLUTION INTRAVENOUS at 22:24

## 2022-02-16 RX ADMIN — CLOSTRIDIUM TETANI TOXOID ANTIGEN (FORMALDEHYDE INACTIVATED), CORYNEBACTERIUM DIPHTHERIAE TOXOID ANTIGEN (FORMALDEHYDE INACTIVATED), BORDETELLA PERTUSSIS TOXOID ANTIGEN (GLUTARALDEHYDE INACTIVATED), BORDETELLA PERTUSSIS FILAMENTOUS HEMAGGLUTININ ANTIGEN (FORMALDEHYDE INACTIVATED), BORDETELLA PERTUSSIS PERTACTIN ANTIGEN, AND BORDETELLA PERTUSSIS FIMBRIAE 2/3 ANTIGEN 0.5 ML: 5; 2; 2.5; 5; 3; 5 INJECTION, SUSPENSION INTRAMUSCULAR at 22:48

## 2022-02-16 ASSESSMENT — ENCOUNTER SYMPTOMS
EYE ITCHING: 1
EYE PAIN: 0
RHINORRHEA: 0
EYE DISCHARGE: 1
EYE REDNESS: 0
PHOTOPHOBIA: 0
EYE PAIN: 0
EYE ITCHING: 0
NEUROLOGICAL NEGATIVE: 1

## 2022-02-16 ASSESSMENT — VISUAL ACUITY
OD: 20/25;WITHOUT CORRECTIVE LENSES
OU: 1
OS: 20/25;WITHOUT CORRECTIVE LENSES

## 2022-02-17 ENCOUNTER — OFFICE VISIT (OUTPATIENT)
Dept: OPHTHALMOLOGY | Facility: CLINIC | Age: 37
End: 2022-02-17
Attending: OPHTHALMOLOGY
Payer: COMMERCIAL

## 2022-02-17 VITALS
TEMPERATURE: 98.2 F | RESPIRATION RATE: 18 BRPM | SYSTOLIC BLOOD PRESSURE: 130 MMHG | HEART RATE: 70 BPM | OXYGEN SATURATION: 99 % | DIASTOLIC BLOOD PRESSURE: 82 MMHG

## 2022-02-17 DIAGNOSIS — H11.442 CONJUNCTIVAL CYST OF LEFT EYE: Primary | ICD-10-CM

## 2022-02-17 PROCEDURE — 76513 OPH US DX ANT SGM US UNI/BI: CPT | Mod: 26 | Performed by: OPHTHALMOLOGY

## 2022-02-17 PROCEDURE — G0463 HOSPITAL OUTPT CLINIC VISIT: HCPCS

## 2022-02-17 PROCEDURE — 99204 OFFICE O/P NEW MOD 45 MIN: CPT | Performed by: OPHTHALMOLOGY

## 2022-02-17 PROCEDURE — 92132 CPTRZD OPH DX IMG ANT SGM: CPT | Performed by: OPHTHALMOLOGY

## 2022-02-17 PROCEDURE — 99207 OCT ANTERIOR SEGMENT SPECTRALIS OS (LEFT EYE): CPT | Mod: 26 | Performed by: OPHTHALMOLOGY

## 2022-02-17 PROCEDURE — 76513 OPH US DX ANT SGM US UNI/BI: CPT | Performed by: OPHTHALMOLOGY

## 2022-02-17 RX ORDER — POLYVINYL ALCOHOL 14 MG/ML
1 SOLUTION/ DROPS OPHTHALMIC 4 TIMES DAILY
Qty: 15 ML | Refills: 0 | Status: SHIPPED | OUTPATIENT
Start: 2022-02-17 | End: 2022-02-17

## 2022-02-17 RX ORDER — POLYVINYL ALCOHOL 14 MG/ML
1 SOLUTION/ DROPS OPHTHALMIC 4 TIMES DAILY
Qty: 15 ML | Refills: 0 | Status: ON HOLD | OUTPATIENT
Start: 2022-02-17 | End: 2023-06-13

## 2022-02-17 RX ORDER — ERYTHROMYCIN 5 MG/G
0.5 OINTMENT OPHTHALMIC 4 TIMES DAILY
Qty: 1 G | Refills: 0 | Status: ON HOLD | OUTPATIENT
Start: 2022-02-17 | End: 2023-06-13

## 2022-02-17 RX ORDER — ERYTHROMYCIN 5 MG/G
0.3 OINTMENT OPHTHALMIC 4 TIMES DAILY
Qty: 7 G | Refills: 1 | Status: ON HOLD | OUTPATIENT
Start: 2022-02-17 | End: 2023-06-13

## 2022-02-17 RX ORDER — ERYTHROMYCIN 5 MG/G
OINTMENT OPHTHALMIC
Status: DISCONTINUED | OUTPATIENT
Start: 2022-02-17 | End: 2022-02-17 | Stop reason: HOSPADM

## 2022-02-17 ASSESSMENT — CONF VISUAL FIELD
METHOD: COUNTING FINGERS
OS_NORMAL: 1
OD_NORMAL: 1

## 2022-02-17 ASSESSMENT — EXTERNAL EXAM - RIGHT EYE: OD_EXAM: NORMAL

## 2022-02-17 ASSESSMENT — EXTERNAL EXAM - LEFT EYE: OS_EXAM: NORMAL

## 2022-02-17 ASSESSMENT — VISUAL ACUITY
METHOD: SNELLEN - LINEAR
OD_CC: 20/20
OS_CC+: +2
CORRECTION_TYPE: GLASSES
OS_CC: 20/30

## 2022-02-17 ASSESSMENT — TONOMETRY
OD_IOP_MMHG: 10
OS_IOP_MMHG: 09
IOP_METHOD: ICARE

## 2022-02-17 NOTE — NURSING NOTE
Chief Complaints and History of Present Illnesses   Patient presents with     Corneal Evaluation     Was seen yesterday in the ED for Foreign body of left conjunctiva     Chief Complaint(s) and History of Present Illness(es)     Corneal Evaluation     Laterality: left eye    Associated symptoms: foreign body sensation.  Negative for flashes, floaters and eye pain    Treatments tried: ointment    Pain scale: 0/10    Comments: Was seen yesterday in the ED for Foreign body of left conjunctiva              Comments     Pt states she droped a glass vase yesterday and thought a piece of glass went into the left eye  Seen in ED yesterday, still has FB feeling today was told to follow up today    Raina FOREMAN 9:20 AM February 17, 2022

## 2022-02-17 NOTE — ED PROVIDER NOTES
History   Chief Complaint:  Eye Problem       HPI   Melody Ramos is a 36 year old female who presents with left eye irritation. The patient had broken a vase and glass flew up and she thinks glass may have gotten into her eye. She mentions it feels like she has multiple eyelashes in her eye. There is also something sticky in her eye that she cannot get out. She can see the foreign object but it is not able to be removed with a q-tip. She had contacts in and it came out fine. Tdap 2012.     Review of Systems   Eyes: Positive for itching. Negative for pain.   All other systems reviewed and are negative.        Allergies:  Amoxicillin    Medications:  Celexa  Provera  Glucophage  Adipex    Past Medical History:     Anxiety  Morbid obesity  PCOS  Hypertension    Past Surgical History:    [place] teeth extraction    Family History:    Thyroid disease    Social History:  The patient presents alone    Physical Exam     Patient Vitals for the past 24 hrs:   BP Temp Temp src Pulse Resp SpO2   02/16/22 1849 (!) 163/87 -- -- -- -- --   02/16/22 1847 -- 97.1  F (36.2  C) Temporal 83 18 100 %       Physical Exam    Gen: alert  Neck: normal ROM  CV: normal rate  Pulm; no resp distress  Eye: PERRL, EOMI, left eye mild conjunctival injection, slit lamp- anterior chamber appears normal, 2mm area that is raised concerning for foreign object present on lateral aspect of sclera of left eye  Skin- facial skin normal    Emergency Department Course     Emergency Department Course:    Reviewed:  I reviewed nursing notes, vitals, past medical history and Care Everywhere    Assessments:  1921 I obtained history and examined the patient as noted above.     2001 I rechecked the patient and explained findings.     Consults:  1946 I spoke with Dr. Griggs, ophthalmology, regarding the foreign body in the patient's eye.     1957 I spoke with Dr. Garcia, [facility], who agreed to transfer the  patient.    Interventions:  1910 Pontocaine 2 drops left eye    1910 Ful-Anjelica 1 strip left eye    Disposition:  The patient was transferred to [facility] ED via private car. Dr. Garcia accepted the patient for transfer.     Impression & Plan     Medical Decision Making:  Melody Ramos is a 36 year old female present with concern for ocular foreign body. There is an area concerning for foreign body to the lateral aspect of the sclera. I attempt to removed it with an eye spud. However, the foreign body appears to be lodged underneath the surface of the sclera and appears to be covered with scleral tissue. She does not have severe eye pain or vision changes to suggest ruptured globe. Discussed with ophthalmology and transferred for ophthalmology consult.       Diagnosis:    ICD-10-CM    1. Foreign body in eye, unspecified laterality, initial encounter  T15.90XA        Scribe Disclosure:  Jonna VAUGHN, am serving as a scribe at 7:10 PM on 2/16/2022 to document services personally performed by Sarah Wheatley MD based on my observations and the provider's statements to me.          Sarah Wheatley MD  02/17/22 1128

## 2022-02-17 NOTE — ED PROVIDER NOTES
"ED Provider Note  [facility]      History     Chief Complaint   Patient presents with     Foreign Body in Eye     HPI  Melody Ramos is a 36 year old female who was transferred from [facility] due to concern for glass in her left eye.     Tonight (~6:30 PM) she dropped three glass vases while squatting near the floor and glass flew into her left eye. Immediately afterwards, she felt irritation in her left eye. No pain. No increased tearing or discharge. No bleeding. She looked in the mirror and thought she saw a piece of glass that she tried to remove with a Q-tip. She was unable to remove the glass but removed her contacts without any problem.    At [facility] she was assessed and tried to remove the glass with a wet Q-tip swab and \"scopper\" unsuccessfully. Due to lack of success, she was transferred to [facility] to be seen by ophthalmology.    Currently she feel like there is 20 eyelashes in her eye and reports mild sticky discharge. No other new eye symptoms.     No history of eye surgeries. No ocular history other than being told there is a benign \"hole\" in her eye.  Last Tdap was in 2012.     Review of Systems   HENT: Negative for rhinorrhea.    Eyes: Positive for discharge. Negative for photophobia, pain, redness, itching and visual disturbance.   Neurological: Negative.      A complete review of systems was performed with pertinent positives and negatives noted in the HPI, and all other systems negative.    Physical Exam   BP: 137/70  Pulse: 82  Temp: 98.3  F (36.8  C)  Resp: 18  SpO2: 98 %  Physical Exam  Constitutional:       Appearance: Normal appearance.   HENT:      Head: Normocephalic and atraumatic.      Nose: Nose normal.      Mouth/Throat:      Mouth: Mucous membranes are dry.   Eyes:      General: Lids are normal. Vision grossly intact. Gaze aligned appropriately.         Right eye: No discharge.         Left eye: Foreign body " (concern for foreign body vs. conjunctival cyst ) present.No discharge.      Extraocular Movements: Extraocular movements intact.      Conjunctiva/sclera:      Right eye: Right conjunctiva is not injected. No chemosis or hemorrhage.     Left eye: Left conjunctiva is not injected. Chemosis present. No hemorrhage.     Pupils: Pupils are equal, round, and reactive to light.      Comments: See ophthalmology's detailed note for full exam   Cardiovascular:      Rate and Rhythm: Normal rate and regular rhythm.      Pulses: Normal pulses.      Heart sounds: Normal heart sounds.   Pulmonary:      Effort: Pulmonary effort is normal.      Breath sounds: Normal breath sounds.   Abdominal:      General: Bowel sounds are normal. There is no distension.      Palpations: Abdomen is soft.      Tenderness: There is no abdominal tenderness.   Musculoskeletal:         General: Normal range of motion.      Cervical back: Normal range of motion and neck supple. No rigidity.   Skin:     General: Skin is warm.      Capillary Refill: Capillary refill takes less than 2 seconds.   Neurological:      General: No focal deficit present.      Mental Status: She is alert and oriented to person, place, and time.      Cranial Nerves: No cranial nerve deficit.      Sensory: Sensation is intact. No sensory deficit.      Motor: No weakness.   Psychiatric:         Attention and Perception: Attention normal.         Mood and Affect: Mood is anxious.         Speech: Speech normal.         Behavior: Behavior normal.       ED Course       20:30 Medical student saw and assessed the patient.   20:40 Discussed the patient with ophthalmology who recommended CT of orbits.     Procedures       The medical record was reviewed and interpreted.  Current labs reviewed and interpreted.  Current images reviewed and interpreted: see below.  Managed outpatient prescription medications.       CT Orbital w/ Contrast:  IMPRESSION:   1.  No orbital radiopaque foreign bodies  are identified.  2.  No acute findings.  3.  Bilateral globes are intact. No acute fracture. No orbital retrobulbar hematomas.     Results for orders placed or performed during the hospital encounter of 02/16/22   CT Orbital w Contrast     Status: None    Narrative    EXAM: CT ORBITAL  W CONTRAST  LOCATION: [facility]  DATE/TIME: 2/16/2022 9:58 PM    INDICATION: concern for glass in left eye, eval if into orbit  COMPARISON: None.  CONTRAST: 48 mL Isovue 370  TECHNIQUE: Routine with IV contrast. Multiplanar reformats. Dose reduction techniques were used.     FINDINGS:   RIGHT ORBIT: Normal periorbital soft tissues, bony orbit, globe, extraocular muscles, optic nerve/sheath, periorbital fat and lacrimal gland.     LEFT ORBIT: Normal periorbital soft tissues, bony orbit, globe, extraocular muscles, optic nerve/sheath, periorbital fat and lacrimal gland.    SINUSES: Left maxillary sinus prominent retention cyst/mucosal polyp. Right maxillary sinus small retention cyst/mucosal polyp. Remaining visualized paranasal sinuses essentially clear. Visualized mastoid air cells bilaterally are essentially clear.    VISUALIZED INTRACRANIAL CONTENTS: No abnormality.       Impression    IMPRESSION:   1.  No orbital radiopaque foreign bodies are identified.  2.  No acute findings.  3.  Bilateral globes are intact. No acute fracture. No orbital retrobulbar hematomas.   Creatinine POCT     Status: Normal   Result Value Ref Range    Creatinine POCT 0.9 0.5 - 1.0 mg/dL    GFR, ESTIMATED POCT >60 >60 mL/min/1.73m2   hCG qual urine POCT     Status: None   Result Value Ref Range    HCG Qual Urine Negative Negative    Internal QC Check POCT Valid Valid    POCT Kit Lot Number [id number]     POCT Kit Expiration Date 02/28/23      Medications   Tdap (tetanus-diphtheria-acell pertussis) (ADACEL) injection 0.5 mL (0.5 mLs Intramuscular Given 2/16/22 2248)   iopamidol (ISOVUE-370) solution 100 mL (50 mLs  Intravenous Given 2/16/22 2224)   sodium chloride 0.9 % bag 100mL for CT scan flush use (50 mLs Intravenous Given 2/16/22 2224)        Assessments & Plan (with Medical Decision Making)   Melody Ramos is a 36 year old female who was transferred from [facility] due to concern for glass in her left eye that could not be removed there and the concern for possible globe rupture. CT returned with no evidence of globe rupture. Tetanus was updated. Ophthalmology was consulted on arrival and saw the patient to attempt to remove the glass shard from her left eye. The examination did not reveal any glass shard in her eye. Instead, there was a clear multi-lobulated area that ophthalmology suspected was a cyst. Per ophthalmology recommendations, she was started on erythromycin and artifical tears and plans to follow-up in clinic closely tomorrow.       I have reviewed the nursing notes. I have reviewed the findings, diagnosis, plan and need for follow up with the patient.    Discharge Medication List as of 2/17/2022 12:22 AM      START taking these medications    Details   erythromycin (ROMYCIN) 5 MG/GM ophthalmic ointment Place 0.5 inches Into the left eye 4 times dailyDisp-1 g, [id number]             Final diagnoses:   Foreign body of left conjunctiva, subsequent encounter     Katheryn Saida, MS4  --  Fabi Doran MD  [facility], located within [facility] EMERGENCY DEPARTMENT      --    ED Attending Physician Attestation    I Fabi Doran MD, cared for this patient with the Medical Student. I performed, or re-performed, the physical exam and medical decision-making. I have verified the accuracy of all the medical student findings and documentation above, and have edited as necessary.    Summary of HPI, PE, ED Course   Patient is a 36 year old female evaluated in the emergency department for concern of foreign body in the left eye. Exam notable as above. ED course notable as above. After the completion of care in  the emergency department, the patient was discharged.    Critical Care & Procedures  Not applicable.    Medical Decision Making  The medical record was reviewed and interpreted.  Current labs reviewed and interpreted.  Current images reviewed and interpreted: as above.  Managed outpatient prescription medications.      Fabi Doran MD  Emergency Medicine       2/16/2022     Fabi Doran MD  05/26/22 1111

## 2022-02-17 NOTE — DISCHARGE INSTRUCTIONS
Go to your Eye appointment (address above - [facility] 9th floor at the [facility]) at 9:15 am tomorrow.     Use the erythromycin ointment 4 times a day while awake and artificial tears 4 times a day.     Return for any new or worsening concerns.

## 2022-02-17 NOTE — CONSULTS
OPHTHALMOLOGY CONSULT NOTE  02/16/22    Patient: Melody Ramos  Consulted by: Dr. Doran  Reason for Consult: Foreign body    HISTORY OF PRESENTING ILLNESS:     Melody Ramos is a 36 year old female who presents from [facility] for foreign body left eye. On 2/16, around 6 pm, the patient broke a glass vase, with possible broken glass exposure to the left eye. Since then, she reports foreign body sensation, eye irritation, and watery discharge left eye. She denies vision changes, flashes, and change in floaters. She is able to see the foreign body on the surface of her left eye. Patient attempted removal with a q-tip at home, was unsuccessful. Per outside ED, attempted removal with q-tip was unsuccessful.     She was wearing contact lenses at the time, which she subsequently removed. She wears monthly contacts. She sleeps in her contacts rarely. She uses saline solution to clean her contact lenses. She denies recent freshwater exposure.     Review of systems were otherwise negative except for that which has been stated above.    OCULAR/MEDICAL/SURGICAL HISTORIES:     Past Ocular History: refractive error  Last eye exam: 2021  Prior eye surgery/laser: none  CTL wearer: yes  Glasses: yes  GTTs: none    FH: AMD (maternal grandmother)    Past Medical History:   Diagnosis Date     Anxiety      Morbid obesity with BMI of 50.0-59.9, adult (H)      NO ACTIVE PROBLEMS        Past Surgical History:   Procedure Laterality Date     NO HISTORY OF SURGERY         EXAMINATION:     Base Eye Exam     Visual Acuity (Near card)       Right Left    Dist sc 20/25 -2 20/25 -1          Tonometry (Tonopen, 11:04 PM)       Right Left    Pressure 16 18          Pupils       Pupils Shape React APD    Right PERRL Round Brisk None    Left PERRL Round Brisk None          Neuro/Psych     Oriented x3: Yes    Mood/Affect: Normal          Dilation     Both eyes: 2.5% Prosper Synephrine, 1.0% Mydriacyl @ 11:04 PM            Slit Lamp and  Fundus Exam     External Exam       Right Left    External Normal Normal          Slit Lamp Exam       Right Left    Lids/Lashes No foreign bodies on lid eversion No foreign bodies on lid eversion    Conjunctiva/Sclera White and quiet Clear multi-lobulated area of mid-peripheral lateral conjunctival elevation measuring 2x2 mm, bernardo negative, no maxwell, no conj laceration    Cornea Diffuse PEE Diffuse PEE, no corneal laceration    Anterior Chamber Deep and quiet Deep and quiet    Iris Round and reactive Round and reactive    Lens Clear Clear    Vitreous Normal Normal          Fundus Exam       Right Left    Disc Normal Normal    Macula Normal Normal    Vessels Normal Normal    Periphery Normal Normal                Labs/Studies/Imaging Performed:  CT orbits (2/16/22):  1.  No orbital radiopaque foreign bodies are identified.  2.  No acute findings.  3.  Bilateral globes are intact. No acute fracture. No orbital retrobulbar hematomas.     ASSESSMENT/PLAN:     Melody Ramos is a 36 year old female who presents with:    # Conjunctival cyst vs conjunctival foreign body, left eye   # Contact lens wear, each eye  # Dry eye, each eye   - Foreign body (from broken glass vase) exposure to left eye at on 2/16/22 at 6 pm.  - VA 20/25 each eye, IOP wnl, pupils unremarkable. Anterior exam notable for clear, multi-lobulated area of mid-peripheral lateral conjunctival elevation measuring 2x2 mm left eye and diffuse PEE each eye; no conjunctival or corneal laceration, subconjunctival hemorrhage, or foreign bodies noted on lid eversion Area of elevation likely represents cyst vs conjunctival foreign body left eye. Gentle palpation with cotton tip applicator more consistent with cyst than glass foreign body. Dilated exam normal each eye.   - CT orbits unremarkable    Plan:  - Irrigated left eye with 10 ml saline at bedside  - Start erythromycin ointment QID left eye   - Start AT QID each eye   - Contact lens vacation until cleared  by ophthalmology  - Return precautions given  - Follow up in cornea clinic at [facility] tomorrow (2/17) at 9:15 am. Will complete VT, surface check, and anterior segment OCT for further evaluation. Importance of follow up emphasized.    It is our pleasure to participate in this patient's care and treatment. Please contact us with any further questions or concerns.    Discussed with Dr. Chan.      Jenna Griggs MD  Ophthalmology Resident, PGY-3  Pager: [phone]

## 2022-02-17 NOTE — PROGRESS NOTES
CC: F/u for FBS left eye from ED consult with resident on call.    Referring Provider: resident on call Dr Jenna Griggs MD      HPI:    Melody Ramos 36 year old White female who presents from [facility] for foreign body left eye. On 2/16, around 6 pm, the patient broke a glass vase, with possible broken glass exposure to the left eye. Since then, she reports foreign body sensation, eye irritation, and watery discharge left eye. She denies vision changes, flashes, and change in floaters. She is able to see the foreign body on the surface of her left eye. Patient attempted removal with a q-tip at home, was unsuccessful. Per outside ED, attempted removal with q-tip was unsuccessful.      She was wearing contact lenses at the time, which she subsequently removed. She wears monthly contacts. She sleeps in her contacts rarely. She uses saline solution to clean her contact lenses. She denies recent freshwater exposure.      Review of systems were otherwise negative except for that which has been stated above.    Interval hx:  Pt here for f/u from ER last night.  She notes that it feels the same as yesterday.  Feels like there is an eyelash(es) in the left eye.  Denies actual pain.  More FBS.      POHx:  refractive error  Last eye exam: 2021  Prior eye surgery/laser: none  CTL wearer: yes  Glasses: yes  GTTs: none     FH: AMD (maternal grandmother)    Gtts Currenly Taking:  Erythromycin ointment QID     Allergies:  Amoxicillin    Labs/Studies/Imaging Performed:  CT orbits (2/16/22):  1.  No orbital radiopaque foreign bodies are identified.  2.  No acute findings.  3.  Bilateral globes are intact. No acute fracture. No orbital retrobulbar hematomas.    A/P:  # Conjunctival cyst vs conjunctival foreign body, left eye   # Contact lens wear, each eye  # Dry eye, each eye   - Foreign body (from broken glass vase) exposure to left eye at on 2/16/22 at 6 pm.  - VA 20/25 each eye, IOP wnl, pupils unremarkable. Anterior  exam notable for clear, multi-lobulated area of mid-peripheral lateral conjunctival elevation measuring 2x2 mm left eye and diffuse PEE each eye; no conjunctival or corneal laceration, subconjunctival hemorrhage, or foreign bodies noted on lid eversion Area of elevation likely represents cyst vs conjunctival foreign body left eye. Gentle palpation with cotton tip applicator and with forceps more consistent with cyst than glass foreign body. Dilated exam normal each eye.   - CT orbits unremarkable  - Anterior segment OCT (2/17/22) consistent with lateral conjunctival cyst, no foreign body  - UBM (2/17/22) consistent with lateral conjunctival cyst; one linear area inferotemporal to the cystic region with hyperechogenicity in the conjunctival/subconjunctival plane noted, concerning for possible foreign body (repeat anterior segment OCT through this area unremarkable).  Repeat exam focused on the area of concern revealed no additional suspicion of foreign body.       PLAN:  - Continue erythromycin ointment QID left eye   - Continue AT QID each eye   - Contact lens vacation until cleared by ophthalmology  - Return precautions given    Follow up Cornea:  1 week with V,T, surface check at next visit.    Jenna Griggs MD  Ophthalmology Resident, PGY-3    Attending Physician Attestation:  Complete documentation of historical and exam elements from today's encounter can be found in the full encounter summary report (not reduplicated in this progress note).  I personally obtained the chief complaint(s) and history of present illness.  I confirmed and edited as necessary the review of systems, past medical/surgical history, family history, social history, and examination findings as documented by others; and I examined the patient myself.  I formulated and edited as necessary the assessment and plan and discussed the findings and management plan with the patient and family.     John Chan,   Cornea Fellow

## 2022-02-17 NOTE — ED TRIAGE NOTES
FB in L eye sent from [facility] to see ophtho.  Per [facility] Tdap last given in 2012.

## 2022-02-17 NOTE — ED TRIAGE NOTES
"Patient c/o glass in her left eye. Happened 15 minutes PTA. States a vase broke and the glass flew up. States she can see the glass - \"it's almost like its sticky\". ABCs intact.   "

## 2022-02-23 ENCOUNTER — OFFICE VISIT (OUTPATIENT)
Dept: OPHTHALMOLOGY | Facility: CLINIC | Age: 37
End: 2022-02-23
Attending: OPHTHALMOLOGY
Payer: COMMERCIAL

## 2022-02-23 DIAGNOSIS — H11.442 CONJUNCTIVAL CYST OF LEFT EYE: Primary | ICD-10-CM

## 2022-02-23 PROCEDURE — 92012 INTRM OPH EXAM EST PATIENT: CPT | Mod: GC | Performed by: OPHTHALMOLOGY

## 2022-02-23 PROCEDURE — 92132 CPTRZD OPH DX IMG ANT SGM: CPT | Performed by: OPHTHALMOLOGY

## 2022-02-23 PROCEDURE — G0463 HOSPITAL OUTPT CLINIC VISIT: HCPCS | Mod: 25

## 2022-02-23 ASSESSMENT — REFRACTION_WEARINGRX
OD_CYLINDER: +0.50
OD_AXIS: 133
OS_AXIS: 061
SPECS_TYPE: SVL
OD_SPHERE: -4.25
OS_SPHERE: -4.25
OS_CYLINDER: +1.00

## 2022-02-23 ASSESSMENT — TONOMETRY
OD_IOP_MMHG: 12
OS_IOP_MMHG: 13
IOP_METHOD: ICARE

## 2022-02-23 ASSESSMENT — CONF VISUAL FIELD
OS_NORMAL: 1
OD_NORMAL: 1

## 2022-02-23 ASSESSMENT — VISUAL ACUITY
OD_CC: 20/20
CORRECTION_TYPE: GLASSES
METHOD: SNELLEN - LINEAR
OS_CC: 20/20

## 2022-02-23 ASSESSMENT — EXTERNAL EXAM - RIGHT EYE: OD_EXAM: NORMAL

## 2022-02-23 ASSESSMENT — SLIT LAMP EXAM - LIDS
COMMENTS: NORMAL
COMMENTS: NORMAL

## 2022-02-23 ASSESSMENT — EXTERNAL EXAM - LEFT EYE: OS_EXAM: NORMAL

## 2022-02-23 NOTE — NURSING NOTE
Chief Complaints and History of Present Illnesses   Patient presents with     Corneal Evaluation     Chief Complaint(s) and History of Present Illness(es)     Corneal Evaluation     Laterality: left eye    Onset: 1 year ago              Comments     Pt. States that she dropped and broke a glass last week and immediately had pain/FB sensation LE. Was seen in ED and no glass was found in either eye. Was told there were some cysts LE. No change in VA BE. No longer feeling any discomfort LE.  Jessica Mcelroy COT 12:40 PM February 23, 2022

## 2022-02-23 NOTE — PROGRESS NOTES
CC: F/u for FBS left eye from ED consult with resident on call.    Referring Provider: resident on call Dr Jenna Griggs MD      HPI:    Melody Ramos 36 year old White female who presents from [facility] for foreign body left eye. On 2/16, around 6 pm, the patient broke a glass vase, with possible broken glass exposure to the left eye. Since then, she reports foreign body sensation, eye irritation, and watery discharge left eye. She denies vision changes, flashes, and change in floaters. She is able to see the foreign body on the surface of her left eye. Patient attempted removal with a q-tip at home, was unsuccessful. Per outside ED, attempted removal with q-tip was unsuccessful.      She was wearing contact lenses at the time, which she subsequently removed. She wears monthly contacts. She sleeps in her contacts rarely. She uses saline solution to clean her contact lenses. She denies recent freshwater exposure.      Review of systems were otherwise negative except for that which has been stated above.    Interval hx:  Pt here for f/u from last week.  She had a exposure to glass shards from a broken vase and may have gotten some glass shards in the eye.  Ant seg OCT and UMB didn't reveal any discrete shards.  The cysts that were present last week seem better.      POHx:  refractive error  Last eye exam: 2021  Prior eye surgery/laser: none  CTL wearer: yes  Glasses: yes  GTTs: none     FH: AMD (maternal grandmother)    Gtts Currenly Taking:  Erythromycin ointment QID     Allergies:  Amoxicillin    Labs/Studies/Imaging Performed:  CT orbits (2/16/22):  1.  No orbital radiopaque foreign bodies are identified.  2.  No acute findings.  3.  Bilateral globes are intact. No acute fracture. No orbital retrobulbar hematomas.    A/P:  # Conjunctival cyst vs conjunctival foreign body, left eye   # Contact lens wear, each eye  # Dry eye, each eye   - Foreign body (from broken glass vase) exposure to left eye at on  2/16/22 at 6 pm.  - VA 20/25 each eye, IOP wnl, pupils unremarkable. Anterior exam notable for clear, multi-lobulated area of mid-peripheral lateral conjunctival elevation measuring 2x2 mm left eye and diffuse PEE each eye; no conjunctival or corneal laceration, subconjunctival hemorrhage, or foreign bodies noted on lid eversion Area of elevation likely represents cyst vs conjunctival foreign body left eye. Gentle palpation with cotton tip applicator and with forceps more consistent with cyst than glass foreign body. Dilated exam normal each eye.   - CT orbits unremarkable  - Anterior segment OCT (2/17/22) consistent with lateral conjunctival cyst, no foreign body  - UBM (2/17/22) consistent with lateral conjunctival cyst; one linear area inferotemporal to the cystic region with hyperechogenicity in the conjunctival/subconjunctival plane noted, concerning for possible foreign body (repeat anterior segment OCT through this area unremarkable).  Repeat exam focused on the area of concern revealed no additional suspicion of foreign body.       PLAN:  - Stop erythromycin ointment QID left eye   - Continue AT QID each eye   - Contact lens vacation for another week, then OK to restart CL wear  - Return precautions given    Follow up Cornea:  1 year with V,T, surface check at next visit, call fi sx worsen to clinic or PRN    John Chan DO  Fellow, Cornea & External Disease  Department of Ophthalmology  [facility]    Attending Physician Attestation:  Complete documentation of historical and exam elements from today's encounter can be found in the full encounter summary report (not reduplicated in this progress note).  I personally obtained the chief complaint(s) and history of present illness.  I confirmed and edited as necessary the review of systems, past medical/surgical history, family history, social history, and examination findings as documented by others; and I examined the patient myself.  I  personally reviewed the relevant tests, images, and reports as documented above.  I formulated and edited as necessary the assessment and plan and discussed the findings and management plan with the patient and family. I personally reviewed the ophthalmic test(s) associated with this encounter, agree with the interpretation(s) as documented by the resident/fellow, and have edited the corresponding report(s) as necessary. - Jesus Kasper MD

## 2022-05-14 ENCOUNTER — HEALTH MAINTENANCE LETTER (OUTPATIENT)
Age: 37
End: 2022-05-14

## 2022-09-04 ENCOUNTER — HEALTH MAINTENANCE LETTER (OUTPATIENT)
Age: 37
End: 2022-09-04

## 2022-11-29 LAB
HEPATITIS B SURFACE ANTIGEN (EXTERNAL): NEGATIVE
HIV1+2 AB SERPL QL IA: NEGATIVE
RUBELLA ANTIBODY IGG (EXTERNAL): POSITIVE
TREPONEMA PALLIDUM ANTIBODY (EXTERNAL): NONREACTIVE

## 2023-05-31 LAB — GROUP B STREPTOCOCCUS (EXTERNAL): NEGATIVE

## 2023-06-03 ENCOUNTER — HEALTH MAINTENANCE LETTER (OUTPATIENT)
Age: 38
End: 2023-06-03

## 2023-06-13 ENCOUNTER — HOSPITAL ENCOUNTER (INPATIENT)
Facility: CLINIC | Age: 38
LOS: 4 days | Discharge: HOME | End: 2023-06-17
Attending: OBSTETRICS & GYNECOLOGY | Admitting: OBSTETRICS & GYNECOLOGY
Payer: COMMERCIAL

## 2023-06-13 ENCOUNTER — TRANSFERRED RECORDS (OUTPATIENT)
Dept: OBGYN | Facility: CLINIC | Age: 38
End: 2023-06-13

## 2023-06-13 ENCOUNTER — ANESTHESIA (OUTPATIENT)
Dept: OBGYN | Facility: CLINIC | Age: 38
End: 2023-06-13
Payer: COMMERCIAL

## 2023-06-13 ENCOUNTER — ANESTHESIA EVENT (OUTPATIENT)
Dept: OBGYN | Facility: CLINIC | Age: 38
End: 2023-06-13
Payer: COMMERCIAL

## 2023-06-13 PROBLEM — O10.919 CHRONIC HYPERTENSION AFFECTING PREGNANCY: Status: ACTIVE | Noted: 2023-06-13

## 2023-06-13 PROBLEM — O99.213 OBESITY AFFECTING PREGNANCY IN THIRD TRIMESTER: Status: ACTIVE | Noted: 2023-06-13

## 2023-06-13 PROBLEM — Z34.90 ENCOUNTER FOR INDUCTION OF LABOR: Status: ACTIVE | Noted: 2023-06-13

## 2023-06-13 PROBLEM — O09.93 HIGH-RISK PREGNANCY IN THIRD TRIMESTER: Status: ACTIVE | Noted: 2023-06-13

## 2023-06-13 LAB
ABO/RH(D): NORMAL
ALBUMIN MFR UR ELPH: 9.5 MG/DL
ALBUMIN SERPL BCG-MCNC: 3.6 G/DL (ref 3.5–5.2)
ALP SERPL-CCNC: 104 U/L (ref 35–104)
ALT SERPL W P-5'-P-CCNC: 13 U/L (ref 0–50)
ANION GAP SERPL CALCULATED.3IONS-SCNC: 14 MMOL/L (ref 7–15)
ANTIBODY SCREEN: NEGATIVE
AST SERPL W P-5'-P-CCNC: 19 U/L (ref 0–45)
BILIRUB SERPL-MCNC: 0.2 MG/DL
BUN SERPL-MCNC: 10.7 MG/DL (ref 6–20)
CALCIUM SERPL-MCNC: 9.4 MG/DL (ref 8.6–10)
CHLORIDE SERPL-SCNC: 102 MMOL/L (ref 98–107)
CREAT SERPL-MCNC: 0.63 MG/DL (ref 0.51–0.95)
CREAT UR-MCNC: 160.8 MG/DL
DEPRECATED HCO3 PLAS-SCNC: 19 MMOL/L (ref 22–29)
ERYTHROCYTE [DISTWIDTH] IN BLOOD BY AUTOMATED COUNT: 14.2 % (ref 10–15)
GFR SERPL CREATININE-BSD FRML MDRD: >90 ML/MIN/1.73M2
GLUCOSE SERPL-MCNC: 80 MG/DL (ref 70–99)
HCT VFR BLD AUTO: 35.6 % (ref 35–47)
HGB BLD-MCNC: 11.5 G/DL (ref 11.7–15.7)
MCH RBC QN AUTO: 28.9 PG (ref 26.5–33)
MCHC RBC AUTO-ENTMCNC: 32.3 G/DL (ref 31.5–36.5)
MCV RBC AUTO: 89 FL (ref 78–100)
PLATELET # BLD AUTO: 293 10E3/UL (ref 150–450)
POTASSIUM SERPL-SCNC: 4.1 MMOL/L (ref 3.4–5.3)
PROT SERPL-MCNC: 7 G/DL (ref 6.4–8.3)
PROT/CREAT 24H UR: 0.06 MG/MG CR (ref 0–0.2)
RBC # BLD AUTO: 3.98 10E6/UL (ref 3.8–5.2)
SODIUM SERPL-SCNC: 135 MMOL/L (ref 136–145)
SPECIMEN EXPIRATION DATE: NORMAL
WBC # BLD AUTO: 11.7 10E3/UL (ref 4–11)

## 2023-06-13 PROCEDURE — 120N000001 HC R&B MED SURG/OB

## 2023-06-13 PROCEDURE — 80053 COMPREHEN METABOLIC PANEL: CPT | Performed by: OBSTETRICS & GYNECOLOGY

## 2023-06-13 PROCEDURE — 86901 BLOOD TYPING SEROLOGIC RH(D): CPT | Performed by: OBSTETRICS & GYNECOLOGY

## 2023-06-13 PROCEDURE — 84156 ASSAY OF PROTEIN URINE: CPT | Performed by: OBSTETRICS & GYNECOLOGY

## 2023-06-13 PROCEDURE — 370N000003 HC ANESTHESIA WARD SERVICE: Performed by: ANESTHESIOLOGY

## 2023-06-13 PROCEDURE — 250N000013 HC RX MED GY IP 250 OP 250 PS 637: Performed by: OBSTETRICS & GYNECOLOGY

## 2023-06-13 PROCEDURE — 86850 RBC ANTIBODY SCREEN: CPT | Performed by: OBSTETRICS & GYNECOLOGY

## 2023-06-13 PROCEDURE — 85027 COMPLETE CBC AUTOMATED: CPT | Performed by: OBSTETRICS & GYNECOLOGY

## 2023-06-13 RX ORDER — SODIUM CHLORIDE, SODIUM LACTATE, POTASSIUM CHLORIDE, CALCIUM CHLORIDE 600; 310; 30; 20 MG/100ML; MG/100ML; MG/100ML; MG/100ML
INJECTION, SOLUTION INTRAVENOUS CONTINUOUS
Status: DISCONTINUED | OUTPATIENT
Start: 2023-06-13 | End: 2023-06-15

## 2023-06-13 RX ORDER — METHYLERGONOVINE MALEATE 0.2 MG/ML
200 INJECTION INTRAVENOUS
Status: DISCONTINUED | OUTPATIENT
Start: 2023-06-13 | End: 2023-06-15

## 2023-06-13 RX ORDER — MISOPROSTOL 100 UG/1
25 TABLET ORAL
Status: COMPLETED | OUTPATIENT
Start: 2023-06-13 | End: 2023-06-14

## 2023-06-13 RX ORDER — NIFEDIPINE 30 MG
30 TABLET, EXTENDED RELEASE ORAL DAILY
COMMUNITY

## 2023-06-13 RX ORDER — ASPIRIN 81 MG/1
81 TABLET ORAL DAILY
Status: ON HOLD | COMMUNITY
End: 2023-06-17

## 2023-06-13 RX ORDER — KETOROLAC TROMETHAMINE 30 MG/ML
30 INJECTION, SOLUTION INTRAMUSCULAR; INTRAVENOUS
Status: DISCONTINUED | OUTPATIENT
Start: 2023-06-13 | End: 2023-06-15

## 2023-06-13 RX ORDER — NIFEDIPINE 30 MG/1
30 TABLET, EXTENDED RELEASE ORAL DAILY
Status: DISCONTINUED | OUTPATIENT
Start: 2023-06-14 | End: 2023-06-15

## 2023-06-13 RX ORDER — ACETAMINOPHEN 325 MG/1
650 TABLET ORAL EVERY 4 HOURS PRN
Status: DISCONTINUED | OUTPATIENT
Start: 2023-06-13 | End: 2023-06-15

## 2023-06-13 RX ORDER — CARBOPROST TROMETHAMINE 250 UG/ML
250 INJECTION, SOLUTION INTRAMUSCULAR
Status: DISCONTINUED | OUTPATIENT
Start: 2023-06-13 | End: 2023-06-15

## 2023-06-13 RX ORDER — FLUOXETINE 40 MG/1
40 CAPSULE ORAL DAILY
COMMUNITY

## 2023-06-13 RX ORDER — ONDANSETRON 4 MG/1
4 TABLET, ORALLY DISINTEGRATING ORAL EVERY 6 HOURS PRN
Status: DISCONTINUED | OUTPATIENT
Start: 2023-06-13 | End: 2023-06-15

## 2023-06-13 RX ORDER — FENTANYL CITRATE 50 UG/ML
100 INJECTION, SOLUTION INTRAMUSCULAR; INTRAVENOUS
Status: DISCONTINUED | OUTPATIENT
Start: 2023-06-13 | End: 2023-06-15

## 2023-06-13 RX ORDER — MISOPROSTOL 200 UG/1
800 TABLET ORAL
Status: DISCONTINUED | OUTPATIENT
Start: 2023-06-13 | End: 2023-06-15

## 2023-06-13 RX ORDER — PRENATAL VIT/IRON FUM/FOLIC AC 27MG-0.8MG
1 TABLET ORAL DAILY
COMMUNITY

## 2023-06-13 RX ORDER — METOCLOPRAMIDE 10 MG/1
10 TABLET ORAL EVERY 6 HOURS PRN
Status: DISCONTINUED | OUTPATIENT
Start: 2023-06-13 | End: 2023-06-15

## 2023-06-13 RX ORDER — MISOPROSTOL 200 UG/1
400 TABLET ORAL
Status: DISCONTINUED | OUTPATIENT
Start: 2023-06-13 | End: 2023-06-15

## 2023-06-13 RX ORDER — OXYTOCIN/0.9 % SODIUM CHLORIDE 30/500 ML
100-340 PLASTIC BAG, INJECTION (ML) INTRAVENOUS CONTINUOUS PRN
Status: DISCONTINUED | OUTPATIENT
Start: 2023-06-13 | End: 2023-06-15

## 2023-06-13 RX ORDER — ONDANSETRON 2 MG/ML
4 INJECTION INTRAMUSCULAR; INTRAVENOUS EVERY 6 HOURS PRN
Status: DISCONTINUED | OUTPATIENT
Start: 2023-06-13 | End: 2023-06-15

## 2023-06-13 RX ORDER — METOCLOPRAMIDE HYDROCHLORIDE 5 MG/ML
10 INJECTION INTRAMUSCULAR; INTRAVENOUS EVERY 6 HOURS PRN
Status: DISCONTINUED | OUTPATIENT
Start: 2023-06-13 | End: 2023-06-15

## 2023-06-13 RX ORDER — OXYTOCIN 10 [USP'U]/ML
10 INJECTION, SOLUTION INTRAMUSCULAR; INTRAVENOUS
Status: DISCONTINUED | OUTPATIENT
Start: 2023-06-13 | End: 2023-06-15

## 2023-06-13 RX ORDER — PROCHLORPERAZINE 25 MG
25 SUPPOSITORY, RECTAL RECTAL EVERY 12 HOURS PRN
Status: DISCONTINUED | OUTPATIENT
Start: 2023-06-13 | End: 2023-06-15

## 2023-06-13 RX ORDER — CITRIC ACID/SODIUM CITRATE 334-500MG
30 SOLUTION, ORAL ORAL
Status: DISCONTINUED | OUTPATIENT
Start: 2023-06-13 | End: 2023-06-15

## 2023-06-13 RX ORDER — LIDOCAINE 40 MG/G
CREAM TOPICAL
Status: DISCONTINUED | OUTPATIENT
Start: 2023-06-13 | End: 2023-06-15

## 2023-06-13 RX ORDER — OXYTOCIN/0.9 % SODIUM CHLORIDE 30/500 ML
340 PLASTIC BAG, INJECTION (ML) INTRAVENOUS CONTINUOUS PRN
Status: DISCONTINUED | OUTPATIENT
Start: 2023-06-13 | End: 2023-06-15

## 2023-06-13 RX ORDER — IBUPROFEN 800 MG/1
800 TABLET, FILM COATED ORAL
Status: DISCONTINUED | OUTPATIENT
Start: 2023-06-13 | End: 2023-06-15

## 2023-06-13 RX ORDER — TERBUTALINE SULFATE 1 MG/ML
0.25 INJECTION, SOLUTION SUBCUTANEOUS
Status: DISCONTINUED | OUTPATIENT
Start: 2023-06-13 | End: 2023-06-15

## 2023-06-13 RX ORDER — NALOXONE HYDROCHLORIDE 0.4 MG/ML
0.2 INJECTION, SOLUTION INTRAMUSCULAR; INTRAVENOUS; SUBCUTANEOUS
Status: DISCONTINUED | OUTPATIENT
Start: 2023-06-13 | End: 2023-06-15

## 2023-06-13 RX ORDER — TRANEXAMIC ACID 10 MG/ML
1 INJECTION, SOLUTION INTRAVENOUS EVERY 30 MIN PRN
Status: DISCONTINUED | OUTPATIENT
Start: 2023-06-13 | End: 2023-06-15

## 2023-06-13 RX ORDER — NALOXONE HYDROCHLORIDE 0.4 MG/ML
0.4 INJECTION, SOLUTION INTRAMUSCULAR; INTRAVENOUS; SUBCUTANEOUS
Status: DISCONTINUED | OUTPATIENT
Start: 2023-06-13 | End: 2023-06-15

## 2023-06-13 RX ORDER — HYDROXYZINE HYDROCHLORIDE 50 MG/1
50 TABLET, FILM COATED ORAL EVERY 6 HOURS PRN
Status: DISCONTINUED | OUTPATIENT
Start: 2023-06-13 | End: 2023-06-15

## 2023-06-13 RX ORDER — PROCHLORPERAZINE MALEATE 10 MG
10 TABLET ORAL EVERY 6 HOURS PRN
Status: DISCONTINUED | OUTPATIENT
Start: 2023-06-13 | End: 2023-06-15

## 2023-06-13 RX ADMIN — Medication 25 MCG: at 19:44

## 2023-06-13 RX ADMIN — Medication 25 MCG: at 23:51

## 2023-06-13 RX ADMIN — Medication 25 MCG: at 13:51

## 2023-06-13 RX ADMIN — FLUOXETINE 40 MG: 20 CAPSULE ORAL at 22:16

## 2023-06-13 RX ADMIN — Medication 25 MCG: at 17:50

## 2023-06-13 RX ADMIN — Medication 25 MCG: at 15:52

## 2023-06-13 RX ADMIN — Medication 25 MCG: at 21:47

## 2023-06-13 ASSESSMENT — ACTIVITIES OF DAILY LIVING (ADL)
ADLS_ACUITY_SCORE: 20
ADLS_ACUITY_SCORE: 31
ADLS_ACUITY_SCORE: 31
ADLS_ACUITY_SCORE: 20

## 2023-06-13 NOTE — PROVIDER NOTIFICATION
06/13/23 1246   Provider Notification   Provider Name/Title Dr. Thapa   Method of Notification Phone   Request Evaluate - Remote   Notification Reason Patient Arrived;SVE;Status Update     Dr Iris Thapa informed of patient arrival and assessment including the following:    Reason for maternal/fetal assessment is induction for CHTN. Fetal status normal baseline, moderate variability, accelerations present and no decelerations. Ctx not occurring, maternal VSS. IV placed and saline locked, admission labs collected, SVE 1/50/-2, aviles: 5, membranes assumed intact.     Plan per provider/orders received to admit patient for cervical ripening with PO cytotec, collect admission labs + CMP, urine PCR. Per MD, treponema labs not required. MD will come to unit to meet patient and assess in person. RN will update MD with any concerns.

## 2023-06-13 NOTE — CARE PLAN
Data: [facility]: 2023 11:34 AM.  Patient admitted for induction for chronic hypertension. Patient is a .  Prenatal record reviewed. Pregnancy  has been complicated by chronic hypertension, AMA, anxiety.  Gestational Age 38w1d. VSS. Fetal movement active. Patient denies uterine contractions, leaking of vaginal fluid/rupture of membranes, vaginal bleeding, abdominal pain, pelvic pressure, nausea, vomiting, headache, visual disturbances, epigastric or URQ pain, significant edema. Support person is present.   Action: Verbal consent for EFM.  Admission assessment completed. Bill of rights reviewed.  Response: Patient verbalized agreement with plan. Will contact Dr Iris Thapa with update and further orders.

## 2023-06-13 NOTE — CARE PLAN
Order received from MD to discontinue strict I/O order, as patient is not newly hypertensive, well-controlled BP, no sx/sx of pre-E. RN will update MD and resume I/O monitoring if any signs of pre-E develop.

## 2023-06-13 NOTE — H&P
[facility] Labor and Delivery History and Physical    Melody Ramos MRN# [id number]   Age: 37 year old YOB: 1985     Date of Admission:  2023    Primary care provider: Clinic, [facility]            Chief Complaint:   Melody Ramos is a 37 year old female who is 38w1d pregnant and being admitted for IOL due to chronic hypertension on nifedipine.  This pregnancy is also complicated by AMA, anxiety and morbid obesity (pre-gravid BMI 51).      Prenatal Care:    - OB labs reviewed: B, Rubella immune, Heb B Ag non-reactive, HIV negative, RPR negative  - Genetics: NIPS neg, AFP neg  - Anatomy ultrasound: level 2 US normal but incomplete (recheck on 3/24 normal, EFW 46%)  - Rh positive, Rhogam not indicated  -  at 28 weeks along with hgb 11.2, RPR  - S/p flu 10/4/22, Tdap 23  - s/p Covid vaccine   - GBS negative  - Feed: breast, has script  - Contraception: vasectomy   - continue taking prenatal vitamins      Chronic hypertension:  On Procardia 30 ER daily.   IOL at 38w, scheduled Monday 612 pm - delayed due to hospital being on divert.   Level 2 (as above), growth  (EFW 25 AC 58)  Continue ASA 81 mg daily  -Careful monitoring of blood pressure, with initiation of antihypertensives for persistent blood pressure greater than 140/90, urgent treatment for sustained blood pressure greater than 160/110  -Serial growth ultrasound every 4 weeks (, , )  -Weekly fetal testing starting at 32-34 weeks for cHTN and pregravid BMI>40 as suggested by ACOG (BPP normal )    Class 3 obesity:  Weight gain of 11-20 pounds recommended, TWG 27 # to date.   Level 2 ultrasound on 23  Weekly testing at 34 weeks  Ppx ASA 81 mg     Hx Infertility    AMA:   Level 2 ultrasound (see above)  NIPS and AFP nl    Anxiety  Stable on prozac 40 mg qd          Pregnancy history:     OBSTETRIC HISTORY:    OB History    Para Term  AB Living   1 0 0 0 0 0   SAB IAB Ectopic  Multiple Live Births   0 0 0 0 0      # Outcome Date GA Lbr Car/2nd Weight Sex Delivery Anes PTL Lv   1 Current                EDC: Estimated Date of Delivery: Jun 26, 2023    Prenatal Labs:   Lab Results   Component Value Date    HGB 12.7 07/31/2014       GBS Status:   Negative  No results found for: GBS    Active Problem List  Patient Active Problem List   Diagnosis     CARDIOVASCULAR SCREENING; LDL GOAL LESS THAN 160     Morbid obesity (H)     Anxiety     Contraception     Encounter for induction of labor     High-risk pregnancy in third trimester     Chronic hypertension affecting pregnancy     Obesity affecting pregnancy in third trimester       Medication Prior to Admission  Medications Prior to Admission   Medication Sig Dispense Refill Last Dose     aspirin 81 MG EC tablet Take 81 mg by mouth daily   6/12/2023     FLUoxetine (PROZAC) 40 MG capsule Take 40 mg by mouth daily   6/12/2023     NIFEdipine ER (ADALAT CC) 30 MG 24 hr tablet Take 30 mg by mouth daily   6/13/2023     Prenatal Vit-Fe Fumarate-FA (PRENATAL MULTIVITAMIN W/IRON) 27-0.8 MG tablet Take 1 tablet by mouth daily   6/12/2023   .        Maternal Past Medical History:     Past Medical History:   Diagnosis Date     Anxiety      Essential hypertension      Morbid obesity with BMI of 50.0-59.9, adult (H)      Sleep apnea                        Family History:     Family History   Problem Relation Age of Onset     Thyroid Disease Mother      Other Cancer Paternal Grandfather         Lung     Diabetes Maternal Uncle         Type 1     Diabetes Other                  Social History:     Social History     Socioeconomic History     Marital status:      Spouse name: Not on file     Number of children: Not on file     Years of education: Not on file     Highest education level: Not on file   Occupational History     Not on file   Tobacco Use     Smoking status: Never     Passive exposure: Never     Smokeless tobacco: Never   Vaping Use     Vaping  "status: Not on file   Substance and Sexual Activity     Alcohol use: Not Currently     Comment: none in pregnancy     Drug use: No     Sexual activity: Yes     Partners: Male     Birth control/protection: None   Other Topics Concern              Review of Systems:   The Review of Systems is negative other than noted in the HPI          Physical Exam:   Vital signs:                         Estimated body mass index is 57.49 kg/m  as calculated from the following:    Height as of 8/4/17: 1.676 m (5' 6\").    Weight as of 8/4/17: 161.6 kg (356 lb 3.2 oz).    Gen:  Pt is alert and oriented x 3.  No acute distress  Abd:  Gravid, obese  Cervix:   Membranes: intact   Dilation: FT per RN   Effacement: 50%   Station:-2   Consistency: average   Position: Mid  Presentation:Vertex  Fetal Heart Rate Tracing: reactive and reassuring  Tocometer: Rare contractions  Ext:  No edema.  Nontender.                       Assessment:   Melody Ramos is a 38w1d pregnant female admitted with induction of labor, indication chronic hypertension on nifedipine.          Plan:   Admit - see IP orders  Cervical ripening with misoprostol  CBC, CMP, Urine protein/creatinine ratio  Continue home nifedipine and prozac  Routine labor care    Iris Thapa MD  "

## 2023-06-13 NOTE — PROGRESS NOTES
S:  Pt sleeping.  Did not arouse.  Discussed pt with RN.  No concerns at this time.    O:  /74 (BP Location: Left arm, Patient Position: Semi-Hernandez's, Cuff Size: Adult Large)   Temp 98.2  F (36.8  C) (Oral)   Resp 16   LMP 2022   SpO2 98%   Breastfeeding No     FHT:  130s category I  [place]:  Irritability noted  SVE:  NE    A/P:  36 yo  at 38 weeks 1 day - IOL for CHTN  -BP stable on nifedipine 30 ER daily (ordered)  -labs Hgb 11.5/Plt 293/ALT 13/AST 19/PCR 0.06  -on prozac 40 mg daily for anxiety (ordered)  -s/p cytotec x 2 - continue  -FHT category I  -continue current care    Iris Thapa MD  Ob/Gyn  [facility]  [phone]

## 2023-06-14 LAB
ALBUMIN SERPL BCG-MCNC: 3.4 G/DL (ref 3.5–5.2)
ALP SERPL-CCNC: 111 U/L (ref 35–104)
ALT SERPL W P-5'-P-CCNC: 13 U/L (ref 0–50)
ANION GAP SERPL CALCULATED.3IONS-SCNC: 13 MMOL/L (ref 7–15)
AST SERPL W P-5'-P-CCNC: 18 U/L (ref 0–45)
BILIRUB SERPL-MCNC: <0.2 MG/DL
BUN SERPL-MCNC: 12.3 MG/DL (ref 6–20)
CALCIUM SERPL-MCNC: 9.8 MG/DL (ref 8.6–10)
CHLORIDE SERPL-SCNC: 103 MMOL/L (ref 98–107)
CREAT SERPL-MCNC: 0.68 MG/DL (ref 0.51–0.95)
DEPRECATED HCO3 PLAS-SCNC: 19 MMOL/L (ref 22–29)
ERYTHROCYTE [DISTWIDTH] IN BLOOD BY AUTOMATED COUNT: 14.2 % (ref 10–15)
GFR SERPL CREATININE-BSD FRML MDRD: >90 ML/MIN/1.73M2
GLUCOSE SERPL-MCNC: 94 MG/DL (ref 70–99)
HCT VFR BLD AUTO: 36.5 % (ref 35–47)
HGB BLD-MCNC: 12.2 G/DL (ref 11.7–15.7)
MCH RBC QN AUTO: 29.5 PG (ref 26.5–33)
MCHC RBC AUTO-ENTMCNC: 33.4 G/DL (ref 31.5–36.5)
MCV RBC AUTO: 88 FL (ref 78–100)
PLATELET # BLD AUTO: 325 10E3/UL (ref 150–450)
POTASSIUM SERPL-SCNC: 3.9 MMOL/L (ref 3.4–5.3)
PROT SERPL-MCNC: 7 G/DL (ref 6.4–8.3)
RBC # BLD AUTO: 4.14 10E6/UL (ref 3.8–5.2)
SODIUM SERPL-SCNC: 135 MMOL/L (ref 136–145)
WBC # BLD AUTO: 13.8 10E3/UL (ref 4–11)

## 2023-06-14 PROCEDURE — 80053 COMPREHEN METABOLIC PANEL: CPT | Performed by: OBSTETRICS & GYNECOLOGY

## 2023-06-14 PROCEDURE — 250N000011 HC RX IP 250 OP 636: Performed by: OBSTETRICS & GYNECOLOGY

## 2023-06-14 PROCEDURE — 36415 COLL VENOUS BLD VENIPUNCTURE: CPT | Performed by: OBSTETRICS & GYNECOLOGY

## 2023-06-14 PROCEDURE — 250N000011 HC RX IP 250 OP 636

## 2023-06-14 PROCEDURE — 120N000001 HC R&B MED SURG/OB

## 2023-06-14 PROCEDURE — 250N000013 HC RX MED GY IP 250 OP 250 PS 637: Performed by: OBSTETRICS & GYNECOLOGY

## 2023-06-14 PROCEDURE — 85027 COMPLETE CBC AUTOMATED: CPT | Performed by: OBSTETRICS & GYNECOLOGY

## 2023-06-14 RX ORDER — ONDANSETRON 4 MG/1
4 TABLET, ORALLY DISINTEGRATING ORAL EVERY 6 HOURS PRN
Status: DISCONTINUED | OUTPATIENT
Start: 2023-06-14 | End: 2023-06-15

## 2023-06-14 RX ORDER — ONDANSETRON 2 MG/ML
4 INJECTION INTRAMUSCULAR; INTRAVENOUS EVERY 6 HOURS PRN
Status: DISCONTINUED | OUTPATIENT
Start: 2023-06-14 | End: 2023-06-15

## 2023-06-14 RX ORDER — LABETALOL HYDROCHLORIDE 5 MG/ML
20-80 INJECTION, SOLUTION INTRAVENOUS EVERY 10 MIN PRN
Status: DISCONTINUED | OUTPATIENT
Start: 2023-06-14 | End: 2023-06-15

## 2023-06-14 RX ORDER — NALBUPHINE HYDROCHLORIDE 20 MG/ML
2.5-5 INJECTION, SOLUTION INTRAMUSCULAR; INTRAVENOUS; SUBCUTANEOUS EVERY 6 HOURS PRN
Status: DISCONTINUED | OUTPATIENT
Start: 2023-06-14 | End: 2023-06-15

## 2023-06-14 RX ORDER — HYDRALAZINE HYDROCHLORIDE 20 MG/ML
10 INJECTION INTRAMUSCULAR; INTRAVENOUS
Status: DISCONTINUED | OUTPATIENT
Start: 2023-06-14 | End: 2023-06-15

## 2023-06-14 RX ORDER — SCOLOPAMINE TRANSDERMAL SYSTEM 1 MG/1
1 PATCH, EXTENDED RELEASE TRANSDERMAL
Status: DISCONTINUED | OUTPATIENT
Start: 2023-06-14 | End: 2023-06-15

## 2023-06-14 RX ORDER — FENTANYL CITRATE-0.9 % NACL/PF 10 MCG/ML
100 PLASTIC BAG, INJECTION (ML) INTRAVENOUS EVERY 5 MIN PRN
Status: DISCONTINUED | OUTPATIENT
Start: 2023-06-14 | End: 2023-06-15

## 2023-06-14 RX ORDER — BUPIVACAINE HYDROCHLORIDE 2.5 MG/ML
10 INJECTION, SOLUTION EPIDURAL; INFILTRATION; INTRACAUDAL ONCE
Status: COMPLETED | OUTPATIENT
Start: 2023-06-14 | End: 2023-06-15

## 2023-06-14 RX ORDER — LABETALOL HYDROCHLORIDE 5 MG/ML
INJECTION, SOLUTION INTRAVENOUS
Status: COMPLETED
Start: 2023-06-14 | End: 2023-06-14

## 2023-06-14 RX ORDER — OXYTOCIN/0.9 % SODIUM CHLORIDE 30/500 ML
1-24 PLASTIC BAG, INJECTION (ML) INTRAVENOUS CONTINUOUS
Status: DISCONTINUED | OUTPATIENT
Start: 2023-06-15 | End: 2023-06-15

## 2023-06-14 RX ADMIN — FENTANYL CITRATE 100 MCG: 50 INJECTION, SOLUTION INTRAMUSCULAR; INTRAVENOUS at 19:52

## 2023-06-14 RX ADMIN — Medication 25 MCG: at 05:53

## 2023-06-14 RX ADMIN — LABETALOL HYDROCHLORIDE 20 MG: 5 INJECTION, SOLUTION INTRAVENOUS at 22:55

## 2023-06-14 RX ADMIN — Medication 25 MCG: at 01:50

## 2023-06-14 RX ADMIN — Medication 25 MCG: at 10:05

## 2023-06-14 RX ADMIN — LABETALOL HYDROCHLORIDE 20 MG: 5 INJECTION, SOLUTION INTRAVENOUS at 17:52

## 2023-06-14 RX ADMIN — NIFEDIPINE 30 MG: 30 TABLET, FILM COATED, EXTENDED RELEASE ORAL at 09:56

## 2023-06-14 RX ADMIN — ONDANSETRON 4 MG: 2 INJECTION INTRAMUSCULAR; INTRAVENOUS at 17:33

## 2023-06-14 RX ADMIN — FLUOXETINE 40 MG: 20 CAPSULE ORAL at 22:45

## 2023-06-14 RX ADMIN — Medication 25 MCG: at 03:50

## 2023-06-14 RX ADMIN — FENTANYL CITRATE 100 MCG: 50 INJECTION, SOLUTION INTRAMUSCULAR; INTRAVENOUS at 17:39

## 2023-06-14 RX ADMIN — Medication 25 MCG: at 12:30

## 2023-06-14 RX ADMIN — FENTANYL CITRATE 100 MCG: 50 INJECTION, SOLUTION INTRAMUSCULAR; INTRAVENOUS at 18:41

## 2023-06-14 RX ADMIN — Medication 25 MCG: at 07:52

## 2023-06-14 ASSESSMENT — ACTIVITIES OF DAILY LIVING (ADL)
ADLS_ACUITY_SCORE: 20

## 2023-06-14 NOTE — PLAN OF CARE
VSS, uses CPAP while sleeping. Denies s/s of preE. Patient has been able to sleep intermittently throughout shift. Ctx irregular with some coupling, pt intermittently feels ctx but denies pain. Difficult to monitor ctx and FHT. Monitors adjusted frequently throughout the night. FHR in 120s-130s bpm, moderate variability. SO, Jorge is at bedside.     Has received 4 doses of cytotec during shift, for a total of 9 doses since admission for IOL.

## 2023-06-14 NOTE — PROVIDER NOTIFICATION
06/14/23 0953   Provider Notification   Provider Name/Title MD Ellison   Method of Notification At Bedside     MD at bedside rounding with PT and RN. Aware of /82. Reviewed fetal tracing. SVE per MD shows 1.5/50/-1. Plan per MD is to complete the 12 doses of cytotec. RN then to recheck cervix and notify MD of results to discuss next steps in plan of care. PT agreeable to plan.

## 2023-06-14 NOTE — PROGRESS NOTES
"Labor progress    Patient has some crampy, did not sleep well last night, but napped this am. Denies headache, nausea, vomiting.     Vitals:    06/14/23 0546 06/14/23 0644 06/14/23 0751 06/14/23 0955   BP: 132/63  136/83 (!) 143/82   BP Location: Right arm      Patient Position: Right side      Cuff Size: Adult Large      Resp: 16  17    Temp: 98.2  F (36.8  C)  97.8  F (36.6  C)    TempSrc: Oral  Oral    SpO2:       Weight:  (!) 157.4 kg (346 lb 14.4 oz)     Height:  1.676 m (5' 6\")       Gen: well female NAD  Abd: gravid NT   FHTs 120s accels, no decels, cat 1  TOCO difficult to trace q2-4 when able  Cvx: 1-2/50/-1  A/P Term induction for CHTN  Continue cytotec  Difficult angle for balloon catheter but could try later if no progress with cytotec.   "

## 2023-06-14 NOTE — PROGRESS NOTES
"Patient still comfortable. Frustrated about no significant progress with cytotec.   /67   Temp 98  F (36.7  C) (Oral)   Resp 17   Ht 1.676 m (5' 6\")   Wt (!) 157.4 kg (346 lb 14.4 oz)   LMP 09/19/2022   SpO2 99%   Breastfeeding No   BMI 55.99 kg/m    FHTs 120s accels. Has been off monitor for shower  TOCO not picking up well.   Cvx 1-2/60/-1  A/P Term induction  Maldonado placed and filled with 80 ml fluid  Will start pitocin in 1 hour if not getting uncomfortable.   Stella Ellison MD    "

## 2023-06-14 NOTE — PROVIDER NOTIFICATION
06/14/23 1746   Provider Notification   Provider Name/Title Dr. Ellison   Method of Notification Electronic Page;Phone   Request Evaluate - Remote   Notification Reason Status Update;Maternal Vital Sign Change     Pt had 2 consecutive severe range Bps, pt was feeling nauseous/flushed/uncomfortable and received zofran & fentanyl. MD putting in hypertension orders, would like labetalol given.

## 2023-06-14 NOTE — PROVIDER NOTIFICATION
06/14/23 1443   Provider Notification   Provider Name/Title MD Ellison   Method of Notification Phone       RN updated MD via phone. PT's SVE essentially unchanged. PT is exhausted and feeling emotional. RN requesting an hour for PT to be off of monitors to eat, move, and shower. Difficulty with fetal and uterine monitoring, Category I when tracing. MD agreeable to plan and plans to come to bedside in an hour or so to discuss next steps in plan of care.

## 2023-06-15 LAB
CREAT SERPL-MCNC: 0.71 MG/DL (ref 0.51–0.95)
GFR SERPL CREATININE-BSD FRML MDRD: >90 ML/MIN/1.73M2
PLATELET # BLD AUTO: 296 10E3/UL (ref 150–450)

## 2023-06-15 PROCEDURE — 10907ZC DRAINAGE OF AMNIOTIC FLUID, THERAPEUTIC FROM PRODUCTS OF CONCEPTION, VIA NATURAL OR ARTIFICIAL OPENING: ICD-10-PCS | Performed by: OBSTETRICS & GYNECOLOGY

## 2023-06-15 PROCEDURE — 722N000001 HC LABOR CARE VAGINAL DELIVERY SINGLE

## 2023-06-15 PROCEDURE — 0HQ9XZZ REPAIR PERINEUM SKIN, EXTERNAL APPROACH: ICD-10-PCS | Performed by: OBSTETRICS & GYNECOLOGY

## 2023-06-15 PROCEDURE — 250N000013 HC RX MED GY IP 250 OP 250 PS 637: Performed by: OBSTETRICS & GYNECOLOGY

## 2023-06-15 PROCEDURE — 250N000011 HC RX IP 250 OP 636: Performed by: OBSTETRICS & GYNECOLOGY

## 2023-06-15 PROCEDURE — 250N000009 HC RX 250: Performed by: OBSTETRICS & GYNECOLOGY

## 2023-06-15 PROCEDURE — 3E0E77Z INTRODUCTION OF ELECTROLYTIC AND WATER BALANCE SUBSTANCE INTO PRODUCTS OF CONCEPTION, VIA NATURAL OR ARTIFICIAL OPENING: ICD-10-PCS | Performed by: OBSTETRICS & GYNECOLOGY

## 2023-06-15 PROCEDURE — 00HU33Z INSERTION OF INFUSION DEVICE INTO SPINAL CANAL, PERCUTANEOUS APPROACH: ICD-10-PCS | Performed by: ANESTHESIOLOGY

## 2023-06-15 PROCEDURE — 36415 COLL VENOUS BLD VENIPUNCTURE: CPT | Performed by: OBSTETRICS & GYNECOLOGY

## 2023-06-15 PROCEDURE — 250N000011 HC RX IP 250 OP 636: Performed by: ANESTHESIOLOGY

## 2023-06-15 PROCEDURE — 999N000157 HC STATISTIC RCP TIME EA 10 MIN

## 2023-06-15 PROCEDURE — 258N000003 HC RX IP 258 OP 636: Performed by: OBSTETRICS & GYNECOLOGY

## 2023-06-15 PROCEDURE — 3E0R3BZ INTRODUCTION OF ANESTHETIC AGENT INTO SPINAL CANAL, PERCUTANEOUS APPROACH: ICD-10-PCS | Performed by: ANESTHESIOLOGY

## 2023-06-15 PROCEDURE — 85049 AUTOMATED PLATELET COUNT: CPT | Performed by: OBSTETRICS & GYNECOLOGY

## 2023-06-15 PROCEDURE — 999N000016 HC STATISTIC ATTENDANCE AT DELIVERY

## 2023-06-15 PROCEDURE — 120N000001 HC R&B MED SURG/OB

## 2023-06-15 PROCEDURE — 82565 ASSAY OF CREATININE: CPT | Performed by: OBSTETRICS & GYNECOLOGY

## 2023-06-15 PROCEDURE — 250N000009 HC RX 250: Performed by: ANESTHESIOLOGY

## 2023-06-15 RX ORDER — TRANEXAMIC ACID 10 MG/ML
1 INJECTION, SOLUTION INTRAVENOUS EVERY 30 MIN PRN
Status: DISCONTINUED | OUTPATIENT
Start: 2023-06-15 | End: 2023-06-17 | Stop reason: HOSPADM

## 2023-06-15 RX ORDER — OXYTOCIN/0.9 % SODIUM CHLORIDE 30/500 ML
1-24 PLASTIC BAG, INJECTION (ML) INTRAVENOUS CONTINUOUS
Status: DISCONTINUED | OUTPATIENT
Start: 2023-06-15 | End: 2023-06-15

## 2023-06-15 RX ORDER — OXYTOCIN/0.9 % SODIUM CHLORIDE 30/500 ML
340 PLASTIC BAG, INJECTION (ML) INTRAVENOUS CONTINUOUS PRN
Status: DISCONTINUED | OUTPATIENT
Start: 2023-06-15 | End: 2023-06-17 | Stop reason: HOSPADM

## 2023-06-15 RX ORDER — NALOXONE HYDROCHLORIDE 0.4 MG/ML
0.4 INJECTION, SOLUTION INTRAMUSCULAR; INTRAVENOUS; SUBCUTANEOUS
Status: DISCONTINUED | OUTPATIENT
Start: 2023-06-15 | End: 2023-06-17 | Stop reason: HOSPADM

## 2023-06-15 RX ORDER — CARBOPROST TROMETHAMINE 250 UG/ML
250 INJECTION, SOLUTION INTRAMUSCULAR
Status: DISCONTINUED | OUTPATIENT
Start: 2023-06-15 | End: 2023-06-17 | Stop reason: HOSPADM

## 2023-06-15 RX ORDER — MISOPROSTOL 200 UG/1
800 TABLET ORAL
Status: DISCONTINUED | OUTPATIENT
Start: 2023-06-15 | End: 2023-06-17 | Stop reason: HOSPADM

## 2023-06-15 RX ORDER — METHYLERGONOVINE MALEATE 0.2 MG/ML
200 INJECTION INTRAVENOUS
Status: DISCONTINUED | OUTPATIENT
Start: 2023-06-15 | End: 2023-06-17 | Stop reason: HOSPADM

## 2023-06-15 RX ORDER — NALOXONE HYDROCHLORIDE 0.4 MG/ML
0.2 INJECTION, SOLUTION INTRAMUSCULAR; INTRAVENOUS; SUBCUTANEOUS
Status: DISCONTINUED | OUTPATIENT
Start: 2023-06-15 | End: 2023-06-17 | Stop reason: HOSPADM

## 2023-06-15 RX ORDER — NIFEDIPINE 30 MG/1
30 TABLET, EXTENDED RELEASE ORAL DAILY
Status: DISCONTINUED | OUTPATIENT
Start: 2023-06-16 | End: 2023-06-17 | Stop reason: HOSPADM

## 2023-06-15 RX ORDER — DIPHENHYDRAMINE HCL 25 MG
50 CAPSULE ORAL EVERY 6 HOURS PRN
Status: CANCELLED | OUTPATIENT
Start: 2023-06-15

## 2023-06-15 RX ORDER — ENOXAPARIN SODIUM 100 MG/ML
40 INJECTION SUBCUTANEOUS EVERY 12 HOURS
Status: DISCONTINUED | OUTPATIENT
Start: 2023-06-16 | End: 2023-06-17 | Stop reason: HOSPADM

## 2023-06-15 RX ORDER — MISOPROSTOL 200 UG/1
400 TABLET ORAL
Status: DISCONTINUED | OUTPATIENT
Start: 2023-06-15 | End: 2023-06-17 | Stop reason: HOSPADM

## 2023-06-15 RX ORDER — BISACODYL 10 MG
10 SUPPOSITORY, RECTAL RECTAL DAILY PRN
Status: DISCONTINUED | OUTPATIENT
Start: 2023-06-15 | End: 2023-06-17 | Stop reason: HOSPADM

## 2023-06-15 RX ORDER — SODIUM CHLORIDE 9 MG/ML
INJECTION, SOLUTION INTRAVENOUS CONTINUOUS
Status: DISCONTINUED | OUTPATIENT
Start: 2023-06-15 | End: 2023-06-15

## 2023-06-15 RX ORDER — DIPHENHYDRAMINE HYDROCHLORIDE 50 MG/ML
25 INJECTION INTRAMUSCULAR; INTRAVENOUS EVERY 6 HOURS PRN
Status: CANCELLED | OUTPATIENT
Start: 2023-06-15

## 2023-06-15 RX ORDER — OXYTOCIN 10 [USP'U]/ML
10 INJECTION, SOLUTION INTRAMUSCULAR; INTRAVENOUS
Status: DISCONTINUED | OUTPATIENT
Start: 2023-06-15 | End: 2023-06-17 | Stop reason: HOSPADM

## 2023-06-15 RX ORDER — DIPHENHYDRAMINE HYDROCHLORIDE 50 MG/ML
50 INJECTION INTRAMUSCULAR; INTRAVENOUS ONCE
Status: COMPLETED | OUTPATIENT
Start: 2023-06-15 | End: 2023-06-15

## 2023-06-15 RX ORDER — PRENATAL VIT/IRON FUM/FOLIC AC 27MG-0.8MG
1 TABLET ORAL DAILY
Status: DISCONTINUED | OUTPATIENT
Start: 2023-06-15 | End: 2023-06-17 | Stop reason: HOSPADM

## 2023-06-15 RX ORDER — HYDROCORTISONE 25 MG/G
CREAM TOPICAL 3 TIMES DAILY PRN
Status: DISCONTINUED | OUTPATIENT
Start: 2023-06-15 | End: 2023-06-17 | Stop reason: HOSPADM

## 2023-06-15 RX ORDER — LIDOCAINE HCL/EPINEPHRINE/PF 2%-1:200K
VIAL (ML) INJECTION PRN
Status: DISCONTINUED | OUTPATIENT
Start: 2023-06-15 | End: 2023-06-15

## 2023-06-15 RX ORDER — DOCUSATE SODIUM 100 MG/1
100 CAPSULE, LIQUID FILLED ORAL DAILY
Status: DISCONTINUED | OUTPATIENT
Start: 2023-06-15 | End: 2023-06-17 | Stop reason: HOSPADM

## 2023-06-15 RX ORDER — IBUPROFEN 800 MG/1
800 TABLET, FILM COATED ORAL EVERY 6 HOURS PRN
Status: DISCONTINUED | OUTPATIENT
Start: 2023-06-15 | End: 2023-06-17 | Stop reason: HOSPADM

## 2023-06-15 RX ORDER — OXYCODONE HYDROCHLORIDE 5 MG/1
5 TABLET ORAL EVERY 6 HOURS PRN
Status: DISCONTINUED | OUTPATIENT
Start: 2023-06-15 | End: 2023-06-17 | Stop reason: HOSPADM

## 2023-06-15 RX ORDER — MODIFIED LANOLIN
OINTMENT (GRAM) TOPICAL
Status: DISCONTINUED | OUTPATIENT
Start: 2023-06-15 | End: 2023-06-17 | Stop reason: HOSPADM

## 2023-06-15 RX ORDER — ACETAMINOPHEN 325 MG/1
650 TABLET ORAL EVERY 4 HOURS PRN
Status: DISCONTINUED | OUTPATIENT
Start: 2023-06-15 | End: 2023-06-17 | Stop reason: HOSPADM

## 2023-06-15 RX ADMIN — Medication: at 02:38

## 2023-06-15 RX ADMIN — SODIUM CHLORIDE: 9 INJECTION, SOLUTION INTRAVENOUS at 12:42

## 2023-06-15 RX ADMIN — DIPHENHYDRAMINE HYDROCHLORIDE 50 MG: 50 INJECTION INTRAMUSCULAR; INTRAVENOUS at 12:45

## 2023-06-15 RX ADMIN — SODIUM CHLORIDE, POTASSIUM CHLORIDE, SODIUM LACTATE AND CALCIUM CHLORIDE: 600; 310; 30; 20 INJECTION, SOLUTION INTRAVENOUS at 14:50

## 2023-06-15 RX ADMIN — Medication: at 09:29

## 2023-06-15 RX ADMIN — SODIUM CHLORIDE, POTASSIUM CHLORIDE, SODIUM LACTATE AND CALCIUM CHLORIDE: 600; 310; 30; 20 INJECTION, SOLUTION INTRAVENOUS at 00:06

## 2023-06-15 RX ADMIN — NIFEDIPINE 30 MG: 30 TABLET, FILM COATED, EXTENDED RELEASE ORAL at 12:19

## 2023-06-15 RX ADMIN — SODIUM CHLORIDE, POTASSIUM CHLORIDE, SODIUM LACTATE AND CALCIUM CHLORIDE: 600; 310; 30; 20 INJECTION, SOLUTION INTRAVENOUS at 06:56

## 2023-06-15 RX ADMIN — DOCUSATE SODIUM 100 MG: 100 CAPSULE, LIQUID FILLED ORAL at 19:48

## 2023-06-15 RX ADMIN — PRENATAL VITAMINS-IRON FUMARATE 27 MG IRON-FOLIC ACID 0.8 MG TABLET 1 TABLET: at 19:48

## 2023-06-15 RX ADMIN — Medication 2 MILLI-UNITS/MIN: at 04:20

## 2023-06-15 RX ADMIN — Medication 100 ML/HR: at 16:54

## 2023-06-15 RX ADMIN — ACETAMINOPHEN 650 MG: 325 TABLET, FILM COATED ORAL at 19:48

## 2023-06-15 RX ADMIN — Medication 340 ML/HR: at 16:26

## 2023-06-15 RX ADMIN — BUPIVACAINE HYDROCHLORIDE 10 ML: 2.5 INJECTION, SOLUTION EPIDURAL; INFILTRATION; INTRACAUDAL at 14:46

## 2023-06-15 RX ADMIN — TRANEXAMIC ACID 1 G: 10 INJECTION, SOLUTION INTRAVENOUS at 16:28

## 2023-06-15 RX ADMIN — FLUOXETINE 40 MG: 20 CAPSULE ORAL at 22:04

## 2023-06-15 RX ADMIN — MISOPROSTOL 800 MCG: 200 TABLET ORAL at 16:35

## 2023-06-15 RX ADMIN — KETOROLAC TROMETHAMINE 30 MG: 30 INJECTION, SOLUTION INTRAMUSCULAR; INTRAVENOUS at 17:07

## 2023-06-15 RX ADMIN — BUPIVACAINE HYDROCHLORIDE 5 ML: 2.5 INJECTION, SOLUTION EPIDURAL; INFILTRATION; INTRACAUDAL at 00:42

## 2023-06-15 RX ADMIN — IBUPROFEN 800 MG: 800 TABLET, FILM COATED ORAL at 23:53

## 2023-06-15 RX ADMIN — LIDOCAINE HYDROCHLORIDE,EPINEPHRINE BITARTRATE 2.5 ML: 20; .005 INJECTION, SOLUTION EPIDURAL; INFILTRATION; INTRACAUDAL; PERINEURAL at 00:40

## 2023-06-15 RX ADMIN — BUPIVACAINE HYDROCHLORIDE 5 ML: 2.5 INJECTION, SOLUTION EPIDURAL; INFILTRATION; INTRACAUDAL at 00:44

## 2023-06-15 RX ADMIN — SODIUM CHLORIDE 250 ML: 9 INJECTION, SOLUTION INTRAVENOUS at 13:00

## 2023-06-15 RX ADMIN — METOCLOPRAMIDE HYDROCHLORIDE 10 MG: 5 INJECTION INTRAMUSCULAR; INTRAVENOUS at 16:43

## 2023-06-15 ASSESSMENT — ACTIVITIES OF DAILY LIVING (ADL)
ADLS_ACUITY_SCORE: 20

## 2023-06-15 NOTE — PROVIDER NOTIFICATION
06/14/23 2132   Provider Notification   Provider Name/Title Dr. Bennett   Method of Notification In Department   Request Evaluate - Remote   Notification Reason Status Update   Maldonado balloon remains in place despite gentle tugging and traction. Pt has received 3 doses IV Fentanyl, can have 1 more dose per order. MD states pt can have whatever she wants for pain control. Contractions 2-7 minutes, palpate moderate. Plan to start 2x2 Pitocin at midnight unless pt gets epidural sooner then start Pit after pt is comfortable. FHT's overall moderate with accels, occasional variable.

## 2023-06-15 NOTE — PROGRESS NOTES
"LABOR NOTE    Subjective: Ctx inadequate w/o cervical change. In to meet patient and discuss plan. She is comfortable with epidural.     Objective:  /60   Pulse 69   Temp 98.5  F (36.9  C) (Oral)   Resp (!) 2   Ht 1.676 m (5' 6\")   Wt (!) 157.4 kg (346 lb 14.4 oz)   LMP 2022   SpO2 100%   Breastfeeding No   BMI 55.99 kg/m     FHT: category 2, reassuring with accels, periods of mod variability, and positive scalp stim; some variables and some lates noted.  [place]: q 3 min  SVE: 6cm/80%/-2, head well applied, FSE in place, movement palpated with check, IUPC replaced posteriorly w/o resistance nor significant bleeding.     Assessment and Plan: Ms. Melody Ramos is a 37 year old  female at 38w3d, being induced for chtn on medication (nifedipine xl 30)    Cervical ripening/IOL  -s/p PO cytotec x12  -s/p Maldonado bulb   -s/p epidural  -Pitocin at 4miliunits  -s/p internals  -Plan was for amnioinfusion, report that did not flush, IUPC removed and clot seen, IUPC replaced posteriorly (placenta noted to be anterior on US).   + Amnioinfusion now   + IV benadryl 50mg   + Cont pitocin   + Cont position changes    Chronic hypertension:  On Procardia 30 ER daily.   Level 2 (as above), growth  (EFW 25 AC 58)  Continue ASA 81 mg daily    Class 3 obesity/excessive weight gain:  Weight gain of 11-20 pounds recommended, TWG 27 # to date.   BMI 56.    Hx Infertility    AMA:   Level 2 ultrasound (see above)  NIPS and AFP nl    Anxiety  Stable on prozac 40 mg every day    Sleep Apnea  Home CPAP postpartum     Fetal status  - Oxygenated, reassuring cat 2  - Amnioinfusion now, continue pitocin  - Plan NICU at delivery    Other  - Avoid methergine if PPH  - No Hx of asthma endorsed  - HELLP labs reassuring  - Admission Hgb 12.2    Anticipate . EFW by last US, approx 6lbs.    Counseling: risks of  include but not limited to: bleeding, hemorrhage, transfusion (and complications), hysterectomy, even death, " infection, VTE, anesthesia (and associated risks), injury to surrounding organs (including but not limited to: skin, nerves, vessels, bowel, bladder, pelvic organs, fetus, other organs), complications long into future, wound/incision complications, increased risk in future pregnancies. All questions were answered. Benefits and alternatives reviewed. After carefully weighing r/b/a, patient feels informed if emergent  were indicated as well emergent blood transfusion. We discussed that this is her last child, but would prefer to avoid tubal ligation at time of surgery. Partner planning vasectomy. Pt reports Hx amoxicillin allergy as a baby w/o known details. Discussed if  would plan on ancef 3g and azithro 500mg. Would also give lovenox ppx postpartum while inpatient approx 12-24 hours after surgery. All questions answered.    Benjamin Newell MD

## 2023-06-15 NOTE — PROGRESS NOTES
"LABOR NOTE    Subjective: Pt comfortable after re-bolus of epidural.     Objective:  BP (!) 150/70   Pulse 69   Temp 98  F (36.7  C) (Oral)   Resp 20   Ht 1.676 m (5' 6\")   Wt (!) 157.4 kg (346 lb 14.4 oz)   LMP 2022   SpO2 100%   Breastfeeding No   BMI 55.99 kg/m     FHT: category 2, with periods of mod and min variability over past hour with spontaneous accels noted, and both variables and late decels noted.   [place]: q 3 min  SVE: 10/100/-1, no molding, positive scalp stim.     Assessment and Plan: Ms. Melody Ramos is a 37 year old  female at 38w3d, being induced for chtn on medication (nifedipine xl 30)    Cervical ripening/IOL  -s/p PO cytotec x12  -s/p Maldonado bulb   -s/p epidural and now re-bolus.  -Pitocin at 4miliunits  -s/p internals and replacement of IUPC  -s/p IV benadryl, position changes, amnioinfusion  - Now complete with reassuring cat 2 FHT, will start pushing.  - She is s/p r/b/a discussion if emergent /blood transfusion (see prior note).    Chronic hypertension:  On Procardia 30 ER daily.   Level 2 (as above), growth  (EFW 25 AC 58)    Class 3 obesity/excessive weight gain:  Weight gain of 11-20 pounds recommended, TWG 27 # to date.   BMI 56.    Hx Infertility    AMA:   Level 2 ultrasound (see above)  NIPS and AFP nl    Anxiety  Stable on prozac 40 mg every day    Sleep Apnea  Home CPAP postpartum     Fetal status  - Oxygenated, reassuring cat 2  - Plan NICU at delivery    Other  - Avoid methergine if PPH  - No Hx of asthma endorsed  - HELLP labs reassuring  - Admission Hgb 12.2    Anticipate . EFW by last US, approx 6lbs.    Benjamin Newell MD      "

## 2023-06-15 NOTE — PROVIDER NOTIFICATION
06/15/23 0800   Provider Notification   Provider Name/Title Dr Newell   Method of Notification In Department   Request Evaluate in Person   Notification Reason Decels;Uterine Activity;Status Update     Dr Newell on unit. Reviewed FHR tracing. Reviewed decels and position changes. Moderate variability present with accels. Order to recheck cervix and if no change to start Pitocin and increase per order by 1 mu.

## 2023-06-15 NOTE — PROGRESS NOTES
Responded to delivery to assist with any  necessary respiratory support for the infant.    No interventions necessary at this time.    Osiris Ceron, RT on 6/15/2023 at 4:49 PM

## 2023-06-15 NOTE — PROVIDER NOTIFICATION
06/14/23 2249   Provider Notification   Provider Name/Title Dr. Bennett   Method of Notification In Department   Request Evaluate - Remote   Notification Reason Maternal Vital Sign Change     BP @ 2200 severe range with mid range repeat 15 minutes later. 2230 BP severe range again, awaiting 15 minute repeat. MD gave VORB to give 20 mg Labetolol.

## 2023-06-15 NOTE — PROVIDER NOTIFICATION
06/15/23 0400   Provider Notification   Provider Name/Title Dr. Bennett   Method of Notification In Department   Request Evaluate - Remote   Notification Reason Decels     MD aware of current cat 2 tracing. Moderate variability with late decels and no accels. Writer is repositioning pt. MD gave VORB to start Pitocin when FHT's are reactive.

## 2023-06-15 NOTE — ANESTHESIA PREPROCEDURE EVALUATION
Anesthesia Pre-Procedure Evaluation    Patient: Melody Ramos   MRN: [id number] : 1985        Procedure : * No procedures listed *          Past Medical History:   Diagnosis Date     Anxiety      Essential hypertension      Morbid obesity with BMI of 50.0-59.9, adult (H)      Sleep apnea       Past Surgical History:   Procedure Laterality Date     wisdom teeth        Allergies   Allergen Reactions     Amoxicillin Rash     Reported from childhood      Social History     Tobacco Use     Smoking status: Never     Passive exposure: Never     Smokeless tobacco: Never   Vaping Use     Vaping status: Not on file   Substance Use Topics     Alcohol use: Not Currently     Comment: none in pregnancy      Wt Readings from Last 1 Encounters:   23 (!) 157.4 kg (346 lb 14.4 oz)        Anesthesia Evaluation            ROS/MED HX  ENT/Pulmonary:     (+) sleep apnea, uses CPAP,     Neurologic:       Cardiovascular:     (+) hypertension-----    METS/Exercise Tolerance:     Hematologic:       Musculoskeletal:       GI/Hepatic:     (+) GERD,     Renal/Genitourinary:       Endo:     (+) Obesity,     Psychiatric/Substance Use:     (+) psychiatric history anxiety     Infectious Disease:       Malignancy:       Other:            Physical Exam    Airway        Mallampati: II   TM distance: > 3 FB   Neck ROM: full   Mouth opening: > 3 cm    Respiratory Devices and Support         Dental           Cardiovascular   cardiovascular exam normal          Pulmonary   pulmonary exam normal                OUTSIDE LABS:  CBC:   Lab Results   Component Value Date    WBC 13.8 (H) 2023    WBC 11.7 (H) 2023    HGB 12.2 2023    HGB 11.5 (L) 2023    HCT 36.5 2023    HCT 35.6 2023     2023     2023     BMP:   Lab Results   Component Value Date     (L) 2023     (L) 2023    POTASSIUM 3.9 2023    POTASSIUM 4.1 2023    CHLORIDE 103 2023     CHLORIDE 102 06/13/2023    CO2 19 (L) 06/14/2023    CO2 19 (L) 06/13/2023    BUN 12.3 06/14/2023    BUN 10.7 06/13/2023    CR 0.68 06/14/2023    CR 0.63 06/13/2023    GLC 94 06/14/2023    GLC 80 06/13/2023     COAGS: No results found for: PTT, INR, FIBR  POC:   Lab Results   Component Value Date    HCG Negative 02/16/2022     HEPATIC:   Lab Results   Component Value Date    ALBUMIN 3.4 (L) 06/14/2023    PROTTOTAL 7.0 06/14/2023    ALT 13 06/14/2023    AST 18 06/14/2023    ALKPHOS 111 (H) 06/14/2023    BILITOTAL <0.2 06/14/2023     OTHER:   Lab Results   Component Value Date    IDALIA 9.8 06/14/2023    LIPASE 105 02/10/2013    TSH 1.6 02/11/2013       Anesthesia Plan    ASA Status:  3      Anesthesia Type: Epidural.              Consents    Anesthesia Plan(s) and associated risks, benefits, and realistic alternatives discussed. Questions answered and patient/representative(s) expressed understanding.     - Discussed: Risks, Benefits and Alternatives for the PROCEDURE were discussed     - Discussed with:  Patient         Postoperative Care            Comments:                Mauri Iraheta MD

## 2023-06-15 NOTE — PROVIDER NOTIFICATION
06/15/23 9458   Provider Notification   Provider Name/Title Dr lombardi   Method of Notification Phone   Request Evaluate - Remote   Notification Reason Decels;Uterine Activity;Pain;Status Update     Continued Cat 2 FHR tracing after nursing interventions. Pitocin remains at 4 mu. Epidural rebolus given for pain on lower left side.  MD asked to come bedside for SVE and plan of care evaluation.

## 2023-06-15 NOTE — PROVIDER NOTIFICATION
06/15/23 0630   Provider Notification   Provider Name/Title Dr. Bennett   Method of Notification Phone   Request Evaluate - Remote   Notification Reason Fetal Baseline Change     FHT's have had periods of late decels since 0430, decels do resolve with position changes for about 30 minutes but then returns to having late and variable decels. Moderate variability with occasional accels present. MVU's have been inadequate. Pitocin turned off @ 0622. SVE unchanged from previous exam. MD gave TORB to keep Pitocin turned off until FHT's reactive and to give 250 ml LR bolus.

## 2023-06-15 NOTE — PROVIDER NOTIFICATION
06/15/23 1300   Provider Notification   Provider Name/Title Dr Newell   Method of Notification At Bedside   Request Evaluate in Person   Notification Reason Decels;Uterine Activity;SVE;Status Update     Dr Newell bedside. Amnio infusion attempted but unable to hang to gravity. Attempted to flush IUPC but resistance felt. IUPC replaced by Dr Newell and amnio infusion stared. Reviewed FHR tracing, decels and nursing interventions.  Pitocin remains at 4 mu. MD aware.  SVE unchanged.

## 2023-06-15 NOTE — PROVIDER NOTIFICATION
06/15/23 1500   Provider Notification   Provider Name/Title Dr Newell   Method of Notification At Bedside   Request Evaluate in Person   Notification Reason Decels;Uterine Activity;Pain;Status Update;SVE     Dr Newell bedside for SVE and FHR tracing review.

## 2023-06-15 NOTE — PROVIDER NOTIFICATION
06/15/23 1515   Provider Notification   Provider Name/Title Dr. Newell   Method of Notification At Bedside   Request Evaluate in Person   Notification Reason SVE;Decels     MD at bedside to evaluate SVE d/t cat 2 FHTs. SVE 10/100/-1. Reassurance of spontaneous accelerations, moderate variability and response to fetal scalp stimulation. VORB per MD begin pushing, MD plans to remain in department with eyes on tracing during pushing.

## 2023-06-15 NOTE — PROVIDER NOTIFICATION
06/15/23 1015   Provider Notification   Provider Name/Title Dr Newell   Method of Notification In Department   Request Evaluate in Person   Notification Reason Decels;Status Update;SVE     Dr Newell in department. Reviewed FHR tracing,. Order modified to increase Pitocin by 2 mu per order set. Will recheck cervix by 1200 unless warranted sooner.

## 2023-06-15 NOTE — L&D DELIVERY NOTE
VAGINAL DELIVERY NOTE  Patient: Melody Ramos  : 1985  MRN: [id number]  Delivery date: 06/15/23  Delivery time: 16:18  EBL: 372mL    Patient presented for scheduled IOL 2/2 cHTN on medication. She is 38 y/o G1 at 38.1 on arrival. She delivered at 38.3wks GA.   Her pregnancy was complicated by cHTN on meds, AMA, morbid obesity, YOGESH (on meds), excessive weight gain, Hx infertility, LAURENCE.  During her admission she had: cytotec, garcia bulb, epidural, pitocin, ROM, internals, re-bolus epidural, IUPC replacement, Cat 2 FHT with uterine resuscitation, IV benadryl dose. Her home procardia continued, HELLP labs wnl on admission.    Once complete she pushed for approximately 30 minutes and delivered precipitously.    She delivered a male .  She pushed effectively and head delivered in J maneuver, followed by anterior and posterior shoulder and the rest of the body followed atraumatically.  The baby transitioned well and was placed on mother, skin-to-skin.   Delayed cord clamp, cut by FOB, with 3VC, arterial and venous cord blood collected and sent. [place] evaluated by NICU team who did not admit .  Placenta delivered with active management intact.  Uterus massaged and IV pitocin running, firm and at umbilicus with TXA x1 dose, pitocin wide open followed by 2nd bag at normal rate, 800mcg RI cytotec.  Perineum inspected and 1st degree complex laceration. Repaired with 3-0 vicryl x2 and 3-0 monocryl x1. Oozing from laceration slowed but continued. Vaginal packing placed with lubricant with tail emanating from vaginal opening and part of vaginal pack cut off.   Upon leaving room, good hemostasis with mom and  in stable condition.  Sponge and needle counts were correct x2, 11x sponges out and 1x sponge left in (the vaginal packing sponge was cut into two pieces with x1 left in and remainder not placed). Garcia catheter placed. Plan for pain control with tylenol, motrin/toradol, and prn oxy.    Will  start ppx BID lovenox tomorrow AM.  Will plan to remove vaginal packing tomorrow AM with rounding and if hemostatic, then plan to remove garcia catheter and start lovenox while inpatient. SCDs ppx in meantime.

## 2023-06-15 NOTE — PROVIDER NOTIFICATION
06/15/23 0213   Provider Notification   Provider Name/Title Dr. Bennett   Method of Notification At Bedside   Request Evaluate in Person   Notification Reason SVE;Membrane Status     SVE 6.5/90/-2. AROM for clear fluid. Maldonado balloon removed. FSE and IUPC placed by MD.

## 2023-06-15 NOTE — PROGRESS NOTES
"LABOR NOTE    Subjective: Reports comfort with epidural. On exam, garcia balloon free in vaginal vault. SVE shows -/-2. AROM of copious clear fluid, using amnihook. FSE/IUPC placed. Pt tolerated well. Will measure MVUs, and augment 2 x 2 pitocin as needed.    Objective:  BP (!) 147/69   Temp 98.2  F (36.8  C) (Oral)   Resp 17   Ht 1.676 m (5' 6\")   Wt (!) 157.4 kg (346 lb 14.4 oz)   LMP 2022   SpO2 100%   Breastfeeding No   BMI 55.99 kg/m     FHT: category 1  [place]: q 3-4 min  SVE: 6-/-2    Assessment and Plan: Ms. Melody Ramos is a 37 year old  female at 38w2d, being induced for chtn on medication (nifedipine xl 30)     Cervical ripening/IOL  -s/p PO cytotec x12  -s/p Garcia bulb 8516-4888  -internal monitors in place, also peanut ball  -AROM 0230 6/15/23  -comfortable with epidural     Chronic hypertension:  On Procardia 30 ER daily.   Level 2 (as above), growth  (EFW 25 AC 58)  Continue ASA 81 mg daily    Class 3 obesity/excessive weight gain:  Weight gain of 11-20 pounds recommended, TWG 27 # to date.     Hx Infertility    AMA:   Level 2 ultrasound (see above)  NIPS and AFP nl    Anxiety  Stable on prozac 40 mg qd     Michelle Bennett MD    "

## 2023-06-15 NOTE — PROVIDER NOTIFICATION
06/15/23 0849   Provider Notification   Provider Name/Title Dr lombardi   Method of Notification In Department   Request Evaluate in Person   Notification Reason Status Update     Updated that Pitocin restarted at 1 mu/hr.

## 2023-06-15 NOTE — PROVIDER NOTIFICATION
06/15/23 1158   Provider Notification   Provider Name/Title Dr Newell   Method of Notification Phone   Request Evaluate in Person   Notification Reason Decels;Uterine Activity;SVE;Status Update     Updated on SVE with no change. Slight swelling felt around cervix. Cat 2 tracing with variable and late decels. Multiple position changes done. Pitocin remains at 4 mu. Order for amnio infusion and 50 mg Benadryl IV.

## 2023-06-15 NOTE — ANESTHESIA PROCEDURE NOTES
"Epidural catheter Procedure Note    Pre-Procedure   Staff -        Anesthesiologist:  Mauri Iraheta MD       Performed By: anesthesiologist       Referred By: Donald       Location: OB       Pre-Anesthestic Checklist: patient identified, IV checked, risks and benefits discussed, informed consent, monitors and equipment checked, pre-op evaluation, at physician/surgeon's request and post-op pain management  Timeout:       Correct Patient: Yes        Correct Procedure: Yes        Correct Site: Yes        Correct Position: Yes   Procedure Documentation  Procedure: epidural catheter   Comments:  Patient desires Labor Epidural for labor analgesia. Vaginal delivery anticipated.    Chart reviewed. Patient examined. No changes to pre procedure chart review. Risks including but not limited to bleeding, infection, nerve injury, PDPH, intrathecal injection, high block, incomplete block, one-sided block, back pain, and low blood pressure discussed in detail. Questions answered. Consent signed.    Pause for the Cause completed. NIBP and pulse ox functioning. L&D nurse present.    Procedure: Sitting. Betadine prep x 3. Sterile drape applied.  Lidocaine 1% x 2 cc local infiltration at L 3-4.  17 G. Tu needle ML SARANYA 1 attempt.  No CSF, paresthesia or blood. 20 g. Epidural catheter inserted w/o resistance 5 cm.  Negative aspiration for CSF and blood. Filter in line.  Test dose Lidocaine 2% w/ 1:200,000 epi x 2.5 cc injected. Negative for neuro change or symptoms of intravascular injection.  Bolus dose: Marcaine 0.25% 5cc x 2 doses (10 cc total).  Infusion orders written.    I or my partner am immediately available. I or my partner will monitor the patient and supervise nursing care at necessary intervals.    JAKollPepe       FOR [facility] ONLY:   Pain Team Contact information: please page the Pain Team Via [employer]. Search \"Pain\". During daytime hours, please page the attending first. At night please page the "  first.

## 2023-06-15 NOTE — PROGRESS NOTES
"LABOR NOTE    Subjective: S/P PO cytotec x12, now with garcia balloon (1630, 80mL). S/p fentanyl x3. Epidural as desired. Pressures now mild range    Objective:  BP (!) 146/80   Temp 98  F (36.7  C) (Oral)   Resp 17   Ht 1.676 m (5' 6\")   Wt (!) 157.4 kg (346 lb 14.4 oz)   LMP 2022   SpO2 99%   Breastfeeding No   BMI 55.99 kg/m     FHT: category 1  [place]: q 4-5 min  SVE: not repeated    Assessment and Plan: Ms. Melody Ramos is a 37 year old  female at 38w2d, being induced for chtn on medication (nifedipine xl 30)    Cervical ripening/IOL  -s/p PO cytotec x12  -now with Garcia bulb since 1630  -will add pitocin at 2400 if pattern inadequate    Chronic hypertension:  On Procardia 30 ER daily.   Level 2 (as above), growth  (EFW 25 AC 58)  Continue ASA 81 mg daily    Class 3 obesity/excessive weight gain:  Weight gain of 11-20 pounds recommended, TWG 27 # to date.     Hx Infertility    AMA:   Level 2 ultrasound (see above)  NIPS and AFP nl    Anxiety  Stable on prozac 40 mg qd    Michelle Bennett MD    "

## 2023-06-16 LAB — HGB BLD-MCNC: 10 G/DL (ref 11.7–15.7)

## 2023-06-16 PROCEDURE — 85018 HEMOGLOBIN: CPT | Performed by: OBSTETRICS & GYNECOLOGY

## 2023-06-16 PROCEDURE — 250N000013 HC RX MED GY IP 250 OP 250 PS 637: Performed by: OBSTETRICS & GYNECOLOGY

## 2023-06-16 PROCEDURE — 120N000001 HC R&B MED SURG/OB

## 2023-06-16 PROCEDURE — 36415 COLL VENOUS BLD VENIPUNCTURE: CPT | Performed by: OBSTETRICS & GYNECOLOGY

## 2023-06-16 PROCEDURE — 250N000011 HC RX IP 250 OP 636: Performed by: OBSTETRICS & GYNECOLOGY

## 2023-06-16 RX ORDER — FERROUS SULFATE 325(65) MG
325 TABLET ORAL DAILY
Status: DISCONTINUED | OUTPATIENT
Start: 2023-06-16 | End: 2023-06-17 | Stop reason: HOSPADM

## 2023-06-16 RX ADMIN — PRENATAL VITAMINS-IRON FUMARATE 27 MG IRON-FOLIC ACID 0.8 MG TABLET 1 TABLET: at 08:06

## 2023-06-16 RX ADMIN — IBUPROFEN 800 MG: 800 TABLET, FILM COATED ORAL at 06:20

## 2023-06-16 RX ADMIN — ENOXAPARIN SODIUM 40 MG: 40 INJECTION SUBCUTANEOUS at 08:06

## 2023-06-16 RX ADMIN — ACETAMINOPHEN 650 MG: 325 TABLET, FILM COATED ORAL at 05:05

## 2023-06-16 RX ADMIN — IBUPROFEN 800 MG: 800 TABLET, FILM COATED ORAL at 16:52

## 2023-06-16 RX ADMIN — ACETAMINOPHEN 650 MG: 325 TABLET, FILM COATED ORAL at 11:55

## 2023-06-16 RX ADMIN — FLUOXETINE 40 MG: 20 CAPSULE ORAL at 22:49

## 2023-06-16 RX ADMIN — DOCUSATE SODIUM 100 MG: 100 CAPSULE, LIQUID FILLED ORAL at 08:06

## 2023-06-16 RX ADMIN — NIFEDIPINE 30 MG: 30 TABLET, FILM COATED, EXTENDED RELEASE ORAL at 08:06

## 2023-06-16 RX ADMIN — IBUPROFEN 800 MG: 800 TABLET, FILM COATED ORAL at 22:49

## 2023-06-16 RX ADMIN — FERROUS SULFATE TAB 325 MG (65 MG ELEMENTAL FE) 325 MG: 325 (65 FE) TAB at 08:06

## 2023-06-16 RX ADMIN — ACETAMINOPHEN 650 MG: 325 TABLET, FILM COATED ORAL at 00:59

## 2023-06-16 RX ADMIN — BENZOCAINE: 11.4 AEROSOL, SPRAY TOPICAL at 04:13

## 2023-06-16 ASSESSMENT — ACTIVITIES OF DAILY LIVING (ADL)
ADLS_ACUITY_SCORE: 20

## 2023-06-16 NOTE — LACTATION NOTE
"This note was copied from a baby's chart.   visit. Azael is Melody's first baby breastfeeding, they have an adopted 10 month old daughter at home. Melody reports feedings have been slow to progress, Azael has been spitty/uncoordinated at breast. They have been using a nipple shield and eMlody has been pumping/feeding EBM back to Azael. Some frustration at feedings expressed by Melody and BALJINDER Patel - they bottle feed with their daughter at home and with breastfeeding slow to  it's been a bit challenging. Writer offered support and validation - discussed expected feeding behaviors in first 24 hours. Questions answered regarding doing more pumping/bottle feeding if they would choose to do so. Azael due to eat - attempted on R breast. Hand expression done to entice latch - nipple shield applied for very smooth/pliable breast tissue. Attempted in cross cradle and football holds - latch achieved in football but Azael with tight and chomping with latch. Removed for ped MD assessment - then brought to L breast in cross cradle hold. Nipple shield again applied, Azael latched with wide mouth and flanged lips. With breast compression Azael moved into rhythmic suck pattern with swallows heard and pointed out to parents. Melody stating this was \"the best he's ever done\" at breast. Excitement shared with parents, encouraged her to continue to pump with feeding for the time being as Azael works to establish himself at breast. Plan for additional lactation follow up as needed.    "

## 2023-06-16 NOTE — PLAN OF CARE
Data: Melody Ramos transferred to Room 430 via wheelchair at 1915. Baby transferred via parent's arms.  Action: Receiving unit notified of transfer: Yes. Patient and family notified of room change. Report given to Bobby RN at 1915. Belongings sent to receiving unit. Accompanied by Registered Nurse. Oriented patient to surroundings. Call light within reach. ID bands double-checked with receiving RN.  Response: Patient tolerated transfer and is stable.    Patients mobililty level scored using the bedside mobility assistance tool (BMAT). Patient is at a mobility level test number: 4. Mobility equipment used: wheelchair. Required assist of 1 staff members. Further use of BMAT scoring not required.

## 2023-06-16 NOTE — PROGRESS NOTES
Patient Name:  Melody Ramos   MRN:  [id number]  Age:  37 year old    YOB: 1985      POSTPARTUM PROGRESS NOTE    Pt is PPD#1 s/p vaginal delivery.  She is doing well without complaints.  Pt is ambulating, voiding, tolerating a regular diet.  Pain is well controlled and lochia is within normal limits.  She is breastfeeding.  Baby is doing well.    Denies headache, vision change, CP, SOB, n/v, upper abdominal pain, or increased swelling.         Objective:    Temp:  [97  F (36.1  C)-98.5  F (36.9  C)] 97.4  F (36.3  C)  Pulse:  [63-74] 64  Resp:  [16-20] 18  BP: (100-157)/(43-83) 116/52  SpO2:  [99 %-100 %] 100 %  346 lbs 14.4 oz    General Appearance:  NAD  Lungs:  unlabored  Cardiovascular:  RRR  Abdomen:  nontender, nondistended  Fundus:  firm, below the umbilicus      Lower extremities:  trace symmetric edema    Lab Review:    ABO/RH(D)   Date Value Ref Range Status   06/13/2023 B POS  Final     Hemoglobin   Date Value Ref Range Status   06/16/2023 10.0 (L) 11.7 - 15.7 g/dL Final   06/14/2023 12.2 11.7 - 15.7 g/dL Final   06/13/2023 11.5 (L) 11.7 - 15.7 g/dL Final   07/31/2014 12.7 11.7 - 15.7 g/dL Final   02/10/2013 12.7 11.7 - 15.7 g/dL Final     Hematocrit   Date Value Ref Range Status   06/14/2023 36.5 35.0 - 47.0 % Final   06/13/2023 35.6 35.0 - 47.0 % Final   07/31/2014 38.5 35.0 - 47.0 % Final   02/10/2013 38.0 35.0 - 47.0 % Final       Lab Results   Component Value Date    WBC 13.8 06/14/2023    WBC 10.3 07/31/2014     Lab Results   Component Value Date    RBC 4.14 06/14/2023    RBC 4.34 07/31/2014     Lab Results   Component Value Date    HGB 10.0 06/16/2023    HGB 12.7 07/31/2014     Lab Results   Component Value Date    HCT 36.5 06/14/2023    HCT 38.5 07/31/2014     No components found for: MCT  Lab Results   Component Value Date    MCV 88 06/14/2023    MCV 89 07/31/2014     Lab Results   Component Value Date    MCH 29.5 06/14/2023    MCH 29.3 07/31/2014     Lab Results   Component  Value Date    MCHC 33.4 2023    MCHC 33.0 2014     Lab Results   Component Value Date    RDW 14.2 2023    RDW 14.0 2014     Lab Results   Component Value Date     06/15/2023     2014       Assessment: 36yo  PPD#1 s/p vaginal delivery, doing well.    Plan:   - Postpartum: recovering well. Pain well controlled. Cont PO pain meds and regular diet.   - CHTN:   - Bps normal with infrequent mild range PP   - Cont Nifed 30mg daily.  If persistent mild range, will increase dose to 60mg daily.   -  HELLP labs normal . No indication to repeat.  - Complex 1st degree laceration: Vaginal packing removed without complication.  Gentle exploration revealed no retained packing/sponge.  Laceration noted to be hemostatic.   - Hgb 10.0.  No s/sx of ABLA   - PO iron   - Maldonado to be removed this AM.  - Anx: Mood stable, no SI/HI.  Cont Prozac 40mg daily  - DVT ppx: Lovenox BID, SCDs, ambulation  - Contraception: vasectomy  - Dispo: anticipate DC PPD#2 or #3, pending BPs      Meredith Chan, MD [facility]  2023

## 2023-06-16 NOTE — DISCHARGE SUMMARY
[facility]    Discharge Summary  Obstetrics    Date of Admission:  2023  Date of Discharge:  2023  Discharging Provider: Danette Rahman,      Discharge Diagnoses   - 38 wk   - Precip delivery, short 2nd stage  - Complex vaginal laceration  - Morbid obesity, BMI >50  - YOGESH  - LAURENCE  - cHTN  - SGA    History of Present Illness   Melody Ramos is a 37 year old female now  who presented to L&D @ 38w1d for IOL. Her pregnancy has been complicated by cHTN on meds, AMA, morbid obesity, YOGESH (on meds), excessive weight gain, Hx infertility, LAURENCE.. Please see her admit H&P for full details of her PMH, PSH, Meds, Allergies and exam on admit.    Hospital Course   The patient had a Normal spontaneous vaginal delivery @ 38w3d, please see her delivery summary for full details. Baby boy born. QBL 372mL. Initial atony improved with Pit x2, TXA x1, CO cyto 800mcg and bimanual massage. Vaginal pack for complex 1st degree laceration repaired. Maldonado placed while packing in place.     Her postpartum course was uncomplicated. Her vaginal packing x1 sponge was removed on PPD 1 and was hemostatic. Maldonado removed on PPD 1. While postpartum inpateint she received BID ppx lovenox. On postpartum day 2, she was meeting all of her postpartum goals and deemed stable for discharge. She was voiding without difficulty, tolerating a regular diet without nausea and vomiting, her pain was well controlled on oral pain medicines and her lochia was appropriate.    Her BP remained at goal on 30mg Procardia XR.    Hgb:   Lab Results   Component Value Date    HGB 12.2 2023    HGB 11.5 2023    HGB 12.7 2014    HGB 12.7 02/10/2013       Rh B+: RH and rhogam was not given    Contraception plan: partner vasectomy. Pt is not interested in BC due to possible SE's.    Instructions:  1) Call for temperature greater than 100.4F, foul smelling vaginal discharge, bleeding more than 1 pad per hour for 2  hrs, pain not controlled by oral pain meds, severe constipation or severe nausea or vomiting.  2) She was instructed to follow-up with her primary OB in 6 weeks for a routine postpartum visit  3) She was instructed to continue her PNV on discharge if she wished to breast feed her infant.    Danette Rahman, DO     Discharge Disposition   Discharged to home   Condition at discharge: Stable    Primary Care Physician   [facility]    Consultations This Hospital Stay   ANESTHESIOLOGY IP CONSULT  LACTATION IP CONSULT    Discharge Orders      Breast pump - Manual/Electric    Breast Pump Documentation:  Manual/Electric Pump: To support adequate breast milk production and nutrition for infant.     I, the undersigned, certify that the above prescribed supplies are medically necessary for this patient and is both reasonable and necessary in reference to accepted standards of medical and necessary in reference to accepted standards of medical practice in the treatment of this patient's condition and is not prescribed as a convenience.     Discharge Medications   Current Discharge Medication List      CONTINUE these medications which have NOT CHANGED    Details   aspirin 81 MG EC tablet Take 81 mg by mouth daily      FLUoxetine (PROZAC) 40 MG capsule Take 40 mg by mouth daily      NIFEdipine ER (ADALAT CC) 30 MG 24 hr tablet Take 30 mg by mouth daily      Prenatal Vit-Fe Fumarate-FA (PRENATAL MULTIVITAMIN W/IRON) 27-0.8 MG tablet Take 1 tablet by mouth daily           Allergies   Allergies   Allergen Reactions     Amoxicillin Rash     Reported from childhood

## 2023-06-16 NOTE — ANESTHESIA POSTPROCEDURE EVALUATION
Patient: Melody Ramos    Procedure: * No procedures listed *       Anesthesia Type:  No value filed.    Note:     Postop Pain Control:    PONV:    Neuro/Psych:    Airway/Respiratory:    CV/Hemodynamics:    Other NRE:    DID A NON-ROUTINE EVENT OCCUR?     Event details/Postop Comments:      S/P epidural for labor.   I or my partner was immediately available for management of this patient during epidural analgesia infusion.  VSS.  Doing well. Block resolved.  Neuro at baseline. Denies positional headache. Minimal side effects easily managed w/ PRN meds. No apparent anesthetic complications. No follow-up required.    Jonnathan Gomes MD             Last vitals:  Vitals:    06/16/23 0221 06/16/23 0619 06/16/23 0631   BP: 127/69 110/43 116/52   Pulse: 63 64 64   Resp:      Temp: 97.7  F (36.5  C) 97.4  F (36.3  C)    SpO2:          Electronically Signed By: Jonnathan Gomes MD  June 16, 2023  8:11 AM

## 2023-06-16 NOTE — PROGRESS NOTES
The following text page was sent to MD:      (prev. 410) - FYI IV occluded after discontinuation of Pitocin. Bleeding scant, ambulating independently without dizziness. IV removed d/t occlusion. Do you want another placed or OK to leave D/Cd for now? Jessica *[phone] Thanks

## 2023-06-16 NOTE — PLAN OF CARE
Vital signs within normal limits-chronic HTN. Managed with Procardia. Postpartum checks within normal limits - see flow record. Patient eating and drinking normally. Patient able to empty bladder independently and is up ambulating. Patient performing self cares, is able to care for infant and is breastfeeding, utilizing nipple shield, every 2-3 hours. Patient is also pumping and feeding infant EBM. Patient medicated with ibuprofen and tylenol during the shift for pain. See MAR. Adequate pain control noted by patient. Patient education done, see flow record. Positive attachment behaviors observed with infant. Patient's spouse present this shift. Continue current plan of care.  Anticipate discharge in 1-2 days.

## 2023-06-16 NOTE — PLAN OF CARE
Goal Outcome Evaluation:    VSS, on room air. Mild pain controlled with ibuprofen, tylenol, & ice application. Vaginal packing in place with dark red drainage. Garcia in place with adequate output. Ax1 d/t garcia. Bonding well with baby boy- Azael.  very supportive @ bedside. Breastfeeding about every 2-2.5 hours with staff assistance with positioning & using a nipple shield. Baby boy refused to suck, coordination improved overnight; however required repeated stimulation. Fingerfed/ syringe fed EBM. Mom began pumping overnight, after each feed. Supplementing with EBM after each feed with bottle. Fundus firm @ midline. Denies preeclampsia symptoms.

## 2023-06-17 VITALS
SYSTOLIC BLOOD PRESSURE: 133 MMHG | BODY MASS INDEX: 47.09 KG/M2 | HEIGHT: 66 IN | TEMPERATURE: 97.6 F | HEART RATE: 76 BPM | RESPIRATION RATE: 18 BRPM | DIASTOLIC BLOOD PRESSURE: 62 MMHG | OXYGEN SATURATION: 100 % | WEIGHT: 293 LBS

## 2023-06-17 PROCEDURE — 250N000013 HC RX MED GY IP 250 OP 250 PS 637: Performed by: OBSTETRICS & GYNECOLOGY

## 2023-06-17 PROCEDURE — 250N000011 HC RX IP 250 OP 636: Performed by: OBSTETRICS & GYNECOLOGY

## 2023-06-17 RX ORDER — ACETAMINOPHEN 325 MG/1
650 TABLET ORAL EVERY 4 HOURS PRN
COMMUNITY
Start: 2023-06-17

## 2023-06-17 RX ORDER — IBUPROFEN 200 MG
800 TABLET ORAL EVERY 6 HOURS PRN
COMMUNITY
Start: 2023-06-17

## 2023-06-17 RX ADMIN — DOCUSATE SODIUM 100 MG: 100 CAPSULE, LIQUID FILLED ORAL at 08:26

## 2023-06-17 RX ADMIN — ENOXAPARIN SODIUM 40 MG: 40 INJECTION SUBCUTANEOUS at 08:27

## 2023-06-17 RX ADMIN — FERROUS SULFATE TAB 325 MG (65 MG ELEMENTAL FE) 325 MG: 325 (65 FE) TAB at 08:26

## 2023-06-17 RX ADMIN — ENOXAPARIN SODIUM 40 MG: 40 INJECTION SUBCUTANEOUS at 00:30

## 2023-06-17 RX ADMIN — NIFEDIPINE 30 MG: 30 TABLET, FILM COATED, EXTENDED RELEASE ORAL at 08:26

## 2023-06-17 RX ADMIN — IBUPROFEN 800 MG: 800 TABLET, FILM COATED ORAL at 06:03

## 2023-06-17 RX ADMIN — PRENATAL VITAMINS-IRON FUMARATE 27 MG IRON-FOLIC ACID 0.8 MG TABLET 1 TABLET: at 08:26

## 2023-06-17 ASSESSMENT — ACTIVITIES OF DAILY LIVING (ADL)
ADLS_ACUITY_SCORE: 20

## 2023-06-17 NOTE — PLAN OF CARE
Vital signs within normal limits-chronic HTN. Managed with Procardia. Postpartum checks within normal limits - see flow record. Patient eating and drinking normally. Patient able to empty bladder independently and is up ambulating. Patient performing self cares, is able to care for infant and is breastfeeding, utilizing nipple shield, every 2-3 hours. Patient is also pumping and feeding infant EBM. Supplementing with donor milk. Adequate pain control noted by patient. Patient education done, see flow record. Positive attachment behaviors observed with infant. Patient's spouse present this shift. Continue current plan of care.       Discharge instructions completed.  Patient states she understands all discharge instructions and all her questions have been answered.  Verbalizes when she needs to return to clinic for follow up for herself and baby.  She is caring for herself and her baby independently.  Prescriptions reviewed and sent to pharmacy.  Postpartum depression symptoms reviewed and encouraged frequent review of depression scale. Patient discharged home with family transporting at 1215.

## 2023-06-17 NOTE — LACTATION NOTE
"This note was copied from a baby's chart.   follow up visit. Melody began supplementing feedings at breast with donor milk yesterday afternoon - she reports Azael is latching for 10-15 minutes at a time, they then offer the donor milk and she has been pumping every other feeding. She feels as though she \"doesn't have much milk:, is seeing a few mL's when pumping. Writer discussed expected volumes when pumping immediately postpartum and milk transition timeline. Encouraged her to continue pumping as able with feeding to help transition her milk in. She has a spectra s1 pump at home - resources for how to use reviewed. Discharge education reviewed - outpatient lactation resources discussed. Encouraged Mleody to call out for lactation support prior to discharge as needed. Bedside RN updated on visit.   "

## 2023-06-17 NOTE — PROGRESS NOTES
[facility] Postpartum Note    S:  Melody Ramos feels good this morning. Was able to sleep last night. Pain control adequate. Lochia minimal. Voiding. Breast and supplement with donor milk. Mood Good.     O:  Vitals were reviewed  BP: 121/62 Systolic (24hrs), Av , Min:121 , Max:133   Diastolic (24hrs), Av, Min:62, Max:65      General: healthy, alert and no distress  Abd: soft, appropriately tender, fundus firm  Legs: Non-tender, 0+ pitting edema    No results found for: RH          Assessment: 36yo  PPD#2 s/p vaginal delivery, doing well.     Plan:   - Postpartum: recovering well. Pain well controlled. Cont PO pain meds and regular diet.   - CHTN:          - Bps normal for the last 24 hours          - Cont Nifed 30mg daily.  will need BP check in next week          -  HELLP labs normal . No indication to repeat.  - Complex 1st degree laceration: stable  - Anx: Mood stable, no SI/HI.  Cont Prozac 40mg daily  - DVT ppx: Lovenox BID, SCDs, ambulation  - Contraception: vasectomy  - Dispo: anticipate DC today      Danette Rahman, DO, DO

## 2023-06-17 NOTE — ADDENDUM NOTE
Addendum  created 06/17/23 0738 by Ashley Peterson MD    Attestation recorded in Intraprocedure, Intraprocedure Attestations filed

## 2023-06-17 NOTE — PLAN OF CARE
VSS. Up ad harrison. Voiding without difficulty. Lochia scant, no clots. Fundus firm and midline. Pain well managed with Ibuprofen. Breastfeeding and supplementing with donor milk, tolerating well. FOB supportive and at bedside. Bonding well with infant.

## 2023-06-18 ENCOUNTER — HOSPITAL ENCOUNTER (OUTPATIENT)
Facility: CLINIC | Age: 38
End: 2023-06-18
Admitting: OBSTETRICS & GYNECOLOGY
Payer: COMMERCIAL

## 2023-06-18 ENCOUNTER — HOSPITAL ENCOUNTER (EMERGENCY)
Facility: CLINIC | Age: 38
End: 2023-06-18
Payer: COMMERCIAL

## 2023-06-18 ENCOUNTER — HOSPITAL ENCOUNTER (OUTPATIENT)
Facility: CLINIC | Age: 38
Discharge: HOME | End: 2023-06-18
Attending: OBSTETRICS & GYNECOLOGY | Admitting: OBSTETRICS & GYNECOLOGY
Payer: COMMERCIAL

## 2023-06-18 VITALS
HEART RATE: 75 BPM | TEMPERATURE: 98.5 F | SYSTOLIC BLOOD PRESSURE: 148 MMHG | RESPIRATION RATE: 16 BRPM | DIASTOLIC BLOOD PRESSURE: 80 MMHG

## 2023-06-18 LAB
ALBUMIN SERPL BCG-MCNC: 3.3 G/DL (ref 3.5–5.2)
ALP SERPL-CCNC: 87 U/L (ref 35–104)
ALT SERPL W P-5'-P-CCNC: 18 U/L (ref 0–50)
ANION GAP SERPL CALCULATED.3IONS-SCNC: 9 MMOL/L (ref 7–15)
AST SERPL W P-5'-P-CCNC: 25 U/L (ref 0–45)
BILIRUB SERPL-MCNC: 0.2 MG/DL
BUN SERPL-MCNC: 8.2 MG/DL (ref 6–20)
CALCIUM SERPL-MCNC: 9.1 MG/DL (ref 8.6–10)
CHLORIDE SERPL-SCNC: 105 MMOL/L (ref 98–107)
CREAT SERPL-MCNC: 0.79 MG/DL (ref 0.51–0.95)
DEPRECATED HCO3 PLAS-SCNC: 24 MMOL/L (ref 22–29)
ERYTHROCYTE [DISTWIDTH] IN BLOOD BY AUTOMATED COUNT: 14.1 % (ref 10–15)
GFR SERPL CREATININE-BSD FRML MDRD: >90 ML/MIN/1.73M2
GLUCOSE SERPL-MCNC: 86 MG/DL (ref 70–99)
HCT VFR BLD AUTO: 32.9 % (ref 35–47)
HGB BLD-MCNC: 10.3 G/DL (ref 11.7–15.7)
MCH RBC QN AUTO: 28.5 PG (ref 26.5–33)
MCHC RBC AUTO-ENTMCNC: 31.3 G/DL (ref 31.5–36.5)
MCV RBC AUTO: 91 FL (ref 78–100)
PLATELET # BLD AUTO: 275 10E3/UL (ref 150–450)
POTASSIUM SERPL-SCNC: 4.2 MMOL/L (ref 3.4–5.3)
PROT SERPL-MCNC: 6.4 G/DL (ref 6.4–8.3)
RBC # BLD AUTO: 3.62 10E6/UL (ref 3.8–5.2)
SODIUM SERPL-SCNC: 138 MMOL/L (ref 136–145)
WBC # BLD AUTO: 8.6 10E3/UL (ref 4–11)

## 2023-06-18 PROCEDURE — 85027 COMPLETE CBC AUTOMATED: CPT | Performed by: OBSTETRICS & GYNECOLOGY

## 2023-06-18 PROCEDURE — 250N000013 HC RX MED GY IP 250 OP 250 PS 637: Performed by: OBSTETRICS & GYNECOLOGY

## 2023-06-18 PROCEDURE — G0463 HOSPITAL OUTPT CLINIC VISIT: HCPCS

## 2023-06-18 PROCEDURE — 36415 COLL VENOUS BLD VENIPUNCTURE: CPT | Performed by: OBSTETRICS & GYNECOLOGY

## 2023-06-18 PROCEDURE — 80053 COMPREHEN METABOLIC PANEL: CPT | Performed by: OBSTETRICS & GYNECOLOGY

## 2023-06-18 RX ORDER — ACETAMINOPHEN 325 MG/1
975 TABLET ORAL ONCE
Status: COMPLETED | OUTPATIENT
Start: 2023-06-18 | End: 2023-06-18

## 2023-06-18 RX ADMIN — ACETAMINOPHEN 975 MG: 325 TABLET, FILM COATED ORAL at 10:32

## 2023-06-18 ASSESSMENT — ACTIVITIES OF DAILY LIVING (ADL)
ADLS_ACUITY_SCORE: 35
ADLS_ACUITY_SCORE: 35

## 2023-06-18 NOTE — PROVIDER NOTIFICATION
06/18/23 1010   Provider Notification   Provider Name/Title Dr. Grijalva   Method of Notification In Department   Request Evaluate - Remote   Notification Reason Maternal Vital Sign Change;Pain;Lab/Diagnostic Study     Dr. Grijalva at nurse station and updated on patient BPs now 139-140/70s, headache persists with rating 5/10, lab results WNL. Orders received to administer 1000mg tylenol PO, if improvement in headache, patient may discharge to home with plan to follow up in clinic next week for BP check.

## 2023-06-18 NOTE — DISCHARGE INSTRUCTIONS
Preeclampsia   Call your doctor right away if you have any of the following:  - Edema (swelling) in your face or hands  - Rapid weight gain-about 1 pound or more in a day  - Headache  - Abdominal pain on your right side  - Vision problems (flashes or spots)  - You have questions or concerns once you return home.  -  Continue to monitor BPs at home 2-3x/day. IF BPs 150s/100s call clinic or present to ED for further evaluation  - Schedule a follow up to be seen clinic for BP check this coming week.

## 2023-06-18 NOTE — PLAN OF CARE
Discharge instructions, s/sx of preeclampsia, when to call the doctor reviewed with patient. Understanding verbalized and all questions answered. Patient discharged to home at 1110.

## 2023-06-18 NOTE — PLAN OF CARE
37 year old  at 4 days post pratum presents to MAC #1 from the ED with c/o persistent headache since yesterday afternoon and elevated BPs at home. Patient reports headache that began yesterday after discharge to home and has not been relieved with ibuprofen, BPs this morning 160/100 and 158/95. Upon arrival to ED /81, patient has not taken anything for headache since last evening. Patient with CHTN, currently taking 30mg procardia daily, most recently took at 0730 this morning. Patient denies visual disturbances, epigastric pain, and edema. Reflexes normal, no clonus bilaterally with mild edema noted in ankles. Orders received from Dr. Grijalva to collect CBC with platelets and CMP, obtain serial BPs. Plan to update MD when lab results available.

## 2024-07-06 ENCOUNTER — HEALTH MAINTENANCE LETTER (OUTPATIENT)
Age: 39
End: 2024-07-06

## 2024-12-04 ENCOUNTER — APPOINTMENT (OUTPATIENT)
Dept: CT IMAGING | Facility: CLINIC | Age: 39
End: 2024-12-04
Payer: COMMERCIAL

## 2024-12-04 ENCOUNTER — HOSPITAL ENCOUNTER (EMERGENCY)
Facility: CLINIC | Age: 39
Discharge: HOME | End: 2024-12-04
Attending: EMERGENCY MEDICINE
Payer: COMMERCIAL

## 2024-12-04 ENCOUNTER — APPOINTMENT (OUTPATIENT)
Dept: GENERAL RADIOLOGY | Facility: CLINIC | Age: 39
End: 2024-12-04
Payer: COMMERCIAL

## 2024-12-04 VITALS
TEMPERATURE: 98.4 F | RESPIRATION RATE: 17 BRPM | BODY MASS INDEX: 47.09 KG/M2 | SYSTOLIC BLOOD PRESSURE: 136 MMHG | HEART RATE: 82 BPM | WEIGHT: 293 LBS | DIASTOLIC BLOOD PRESSURE: 89 MMHG | OXYGEN SATURATION: 98 % | HEIGHT: 66 IN

## 2024-12-04 DIAGNOSIS — U07.1 PNEUMONIA DUE TO 2019 NOVEL CORONAVIRUS: ICD-10-CM

## 2024-12-04 DIAGNOSIS — J12.82 PNEUMONIA DUE TO 2019 NOVEL CORONAVIRUS: ICD-10-CM

## 2024-12-04 DIAGNOSIS — U07.1 COVID-19: ICD-10-CM

## 2024-12-04 LAB
ALBUMIN SERPL BCG-MCNC: 4 G/DL (ref 3.5–5.2)
ALP SERPL-CCNC: 80 U/L (ref 40–150)
ALT SERPL W P-5'-P-CCNC: 18 U/L (ref 0–50)
ANION GAP SERPL CALCULATED.3IONS-SCNC: 12 MMOL/L (ref 7–15)
AST SERPL W P-5'-P-CCNC: 24 U/L (ref 0–45)
BASOPHILS # BLD AUTO: 0 10E3/UL (ref 0–0.2)
BASOPHILS NFR BLD AUTO: 0 %
BILIRUB SERPL-MCNC: 0.4 MG/DL
BUN SERPL-MCNC: 9.6 MG/DL (ref 6–20)
CALCIUM SERPL-MCNC: 9.3 MG/DL (ref 8.8–10.4)
CHLORIDE SERPL-SCNC: 101 MMOL/L (ref 98–107)
CREAT SERPL-MCNC: 0.95 MG/DL (ref 0.51–0.95)
EGFRCR SERPLBLD CKD-EPI 2021: 78 ML/MIN/1.73M2
EOSINOPHIL # BLD AUTO: 0.5 10E3/UL (ref 0–0.7)
EOSINOPHIL NFR BLD AUTO: 6 %
ERYTHROCYTE [DISTWIDTH] IN BLOOD BY AUTOMATED COUNT: 13.4 % (ref 10–15)
GLUCOSE SERPL-MCNC: 88 MG/DL (ref 70–99)
HCG SERPL QL: NEGATIVE
HCO3 SERPL-SCNC: 21 MMOL/L (ref 22–29)
HCT VFR BLD AUTO: 39.8 % (ref 35–47)
HGB BLD-MCNC: 12.9 G/DL (ref 11.7–15.7)
IMM GRANULOCYTES # BLD: 0 10E3/UL
IMM GRANULOCYTES NFR BLD: 0 %
LYMPHOCYTES # BLD AUTO: 1.6 10E3/UL (ref 0.8–5.3)
LYMPHOCYTES NFR BLD AUTO: 20 %
MCH RBC QN AUTO: 28.4 PG (ref 26.5–33)
MCHC RBC AUTO-ENTMCNC: 32.4 G/DL (ref 31.5–36.5)
MCV RBC AUTO: 88 FL (ref 78–100)
MONOCYTES # BLD AUTO: 0.6 10E3/UL (ref 0–1.3)
MONOCYTES NFR BLD AUTO: 7 %
NEUTROPHILS # BLD AUTO: 5.4 10E3/UL (ref 1.6–8.3)
NEUTROPHILS NFR BLD AUTO: 66 %
NRBC # BLD AUTO: 0 10E3/UL
NRBC BLD AUTO-RTO: 0 /100
PLATELET # BLD AUTO: 295 10E3/UL (ref 150–450)
POTASSIUM SERPL-SCNC: 4.9 MMOL/L (ref 3.4–5.3)
PROT SERPL-MCNC: 7.7 G/DL (ref 6.4–8.3)
RBC # BLD AUTO: 4.55 10E6/UL (ref 3.8–5.2)
SODIUM SERPL-SCNC: 134 MMOL/L (ref 135–145)
WBC # BLD AUTO: 8.1 10E3/UL (ref 4–11)

## 2024-12-04 PROCEDURE — 250N000009 HC RX 250

## 2024-12-04 PROCEDURE — 250N000011 HC RX IP 250 OP 636

## 2024-12-04 PROCEDURE — 71046 X-RAY EXAM CHEST 2 VIEWS: CPT

## 2024-12-04 PROCEDURE — 94640 AIRWAY INHALATION TREATMENT: CPT

## 2024-12-04 PROCEDURE — 36415 COLL VENOUS BLD VENIPUNCTURE: CPT

## 2024-12-04 PROCEDURE — 99285 EMERGENCY DEPT VISIT HI MDM: CPT | Mod: 25

## 2024-12-04 PROCEDURE — 82040 ASSAY OF SERUM ALBUMIN: CPT

## 2024-12-04 PROCEDURE — 250N000013 HC RX MED GY IP 250 OP 250 PS 637

## 2024-12-04 PROCEDURE — 84703 CHORIONIC GONADOTROPIN ASSAY: CPT

## 2024-12-04 PROCEDURE — 85025 COMPLETE CBC W/AUTO DIFF WBC: CPT

## 2024-12-04 PROCEDURE — 84155 ASSAY OF PROTEIN SERUM: CPT

## 2024-12-04 PROCEDURE — 71260 CT THORAX DX C+: CPT

## 2024-12-04 RX ORDER — ALBUTEROL SULFATE 90 UG/1
2 INHALANT RESPIRATORY (INHALATION) EVERY 6 HOURS PRN
Qty: 18 G | Refills: 0 | Status: SHIPPED | OUTPATIENT
Start: 2024-12-04

## 2024-12-04 RX ORDER — LEVALBUTEROL TARTRATE 45 UG/1
2 AEROSOL, METERED ORAL EVERY 4 HOURS PRN
Qty: 15 G | Refills: 0 | Status: SHIPPED | OUTPATIENT
Start: 2024-12-04

## 2024-12-04 RX ORDER — IOPAMIDOL 755 MG/ML
500 INJECTION, SOLUTION INTRAVASCULAR ONCE
Status: COMPLETED | OUTPATIENT
Start: 2024-12-04 | End: 2024-12-04

## 2024-12-04 RX ORDER — AZITHROMYCIN 250 MG/1
TABLET, FILM COATED ORAL
Qty: 6 TABLET | Refills: 0 | Status: SHIPPED | OUTPATIENT
Start: 2024-12-04 | End: 2024-12-09

## 2024-12-04 RX ORDER — ALBUTEROL SULFATE 90 UG/1
2 INHALANT RESPIRATORY (INHALATION) ONCE
Status: COMPLETED | OUTPATIENT
Start: 2024-12-04 | End: 2024-12-04

## 2024-12-04 RX ORDER — DEXAMETHASONE SODIUM PHOSPHATE 10 MG/ML
10 INJECTION, SOLUTION INTRAMUSCULAR; INTRAVENOUS ONCE
Status: DISCONTINUED | OUTPATIENT
Start: 2024-12-04 | End: 2024-12-04

## 2024-12-04 RX ADMIN — ALBUTEROL SULFATE 2 PUFF: 90 AEROSOL, METERED RESPIRATORY (INHALATION) at 13:27

## 2024-12-04 RX ADMIN — IOPAMIDOL 100 ML: 755 INJECTION, SOLUTION INTRAVENOUS at 11:28

## 2024-12-04 RX ADMIN — Medication 10 MG: at 10:15

## 2024-12-04 RX ADMIN — SODIUM CHLORIDE 73 ML: 9 INJECTION, SOLUTION INTRAVENOUS at 11:28

## 2024-12-04 ASSESSMENT — ACTIVITIES OF DAILY LIVING (ADL)
ADLS_ACUITY_SCORE: 46

## 2024-12-04 ASSESSMENT — COLUMBIA-SUICIDE SEVERITY RATING SCALE - C-SSRS
6. HAVE YOU EVER DONE ANYTHING, STARTED TO DO ANYTHING, OR PREPARED TO DO ANYTHING TO END YOUR LIFE?: NO
1. IN THE PAST MONTH, HAVE YOU WISHED YOU WERE DEAD OR WISHED YOU COULD GO TO SLEEP AND NOT WAKE UP?: NO
2. HAVE YOU ACTUALLY HAD ANY THOUGHTS OF KILLING YOURSELF IN THE PAST MONTH?: NO

## 2024-12-04 NOTE — ED TRIAGE NOTES
Pt complains of sob, rash on bilateral legs and arms. Pt states rash came yesterday. Pt complains of sob since Sunday. Pt was dx w/ covid 12/2. No travel outside of [place] in past 30 days.

## 2024-12-04 NOTE — DISCHARGE INSTRUCTIONS
Discharge Instructions  Bronchitis, Pneumonia, Bronchospasm    You were seen today for a chest infection or inflammation. If your provider decided this was due to a bacterial infection, you may need an antibiotic. Sometimes these are caused by a virus, and then an antibiotic will not help.     Generally, every Emergency Department visit should have a follow-up clinic visit with either a primary or a specialty clinic/provider. Please follow-up as instructed by your emergency provider today.    Return to the Emergency Department if:  Your breathing gets much worse.  You are very weak, or feel much more ill.  You develop new symptoms, such as chest pain.  You cough up blood.  You are vomiting (throwing up) enough that you cannot keep fluids or your medicine down.    What can I do to help myself?  Fill any prescriptions the provider gave you and take them right away--especially antibiotics. Be sure to finish the whole antibiotic prescription.  You may be given a prescription for an inhaler, which can help loosen tight air passages.  Use this as needed, but not more often than directed. Inhalers work much better when used with a spacer.   You may be given a prescription for a steroid to reduce inflammation. Used long-term, these can have side effects, but for short-term use they are safe. You may notice restlessness or increased appetite.      You may use non-prescription cough or cold medicines. Cough medicines may help, but don t make the cough go away completely.   Avoid smoke, because this can make your symptoms worse. If you smoke, this may be a good time to quit! Consider using nicotine lozenges, gum, or patches to reduce cravings.   If you have a fever, Tylenol  (acetaminophen), Motrin  (ibuprofen), or Advil  (ibuprofen) may help bring fever down and may help you feel more comfortable. Be sure to read and follow the package directions, and ask your provider if you have questions.  Be sure to get your flu shot each  year.  For certain ages, the pneumonia shot can help prevent pneumonia.  If you were given a prescription for medicine here today, be sure to read all of the information (including the package insert) that comes with your prescription.  This will include important information about the medicine, its side effects, and any warnings that you need to know about.  The pharmacist who fills the prescription can provide more information and answer questions you may have about the medicine.  If you have questions or concerns that the pharmacist cannot address, please call or return to the Emergency Department.     Remember that you can always come back to the Emergency Department if you are not able to see your regular provider in the amount of time listed above, if you get any new symptoms, or if there is anything that worries you.       Discharge Instructions  COVID-19    COVID-19 is a viral illness that spreads from person-to-person primarily by droplets when an infected person coughs or sneezes and the droplets are then breathed in by another person.    Symptoms of COVID-19  Many people have no symptoms or mild symptoms.  Symptoms usually appear within a few days after contact with a person with COVID-19.  A mild COVID-19 illness is like a cold and can have fever, cough, sneezing, sore throat, tiredness, headache, and muscle pain. Some patients also have stomach symptoms like nausea, vomiting, or diarrhea.  A moderate COVID-19 illness might include shortness of breath or pneumonia on a chest x-ray.  A severe COVID-19 illness causes significant breathing problems such as low oxygen levels or more serious pneumonia.  Some patients experience loss of taste or smell which is somewhat unique to COVID-19.    What should I do if I test positive?  If you test positive for COVID and have no symptoms, you should take precautions, as described below, for five days.  If you test positive for COVID and have symptoms, you should stay  home and away from others. You can go back to normal activities when you are feeling better and have been without a fever (without using medications to treat the fever) for 24 hours. When you return to normal activities you should take precautions, as described below, for five days.  Precautions to prevent the spread of COVID-19  Clean Air. Viruses spread from person to person in the air. Bring fresh air into your home by opening doors or windows or using exhaust fans if possible. Use an air filter. Be outdoors when possible.  Practice good hygiene. Cover your mouth and nose with a tissue when you cough or sneeze. Wash your hands often with soap and water for at least 20 seconds or use an       alcohol-based hand  containing at least 60% alcohol. Avoid touching your face. Clean surfaces such as countertops, handrails, and doorknobs.  Wear a facemask. Masks both prevent an infected person from spreading the virus and prevent a well person from getting the virus. Cloth masks are good, surgical/disposable masks are better, and respirators (N95) are the best.  Practice physical distancing. Avoid being close to someone with cough or other symptoms. Avoid crowded spaces.   What should I do for myself while I am sick?  Treat your symptoms. You can take Acetaminophen (Tylenol) to treat body aches and fever as needed for comfort. Ibuprofen (Advil or Motrin) can be used as well if you still have symptoms after taking Tylenol. Drink fluids. Rest.  Watch for worsening symptoms such as shortness of breath/difficulty breathing or very severe weakness.  Exercise/Sports in rare cases, COVID could affect your heart in a way that makes exercise or participation in sports dangerous.  If you have a mild COVID illness (fever, cough, sore throat, and similar symptoms but no difficulty breathing or abnormalities of the lung): After your COVID symptoms have resolved, you can return to exercise. If you develop difficulty breathing  or chest discomfort with exercise, palpitations (a sensation of your heart racing or skipping), or lightheadedness/passing out, you should contact your doctor/clinic.  If you have more than a mild illness (meaning that you have problems with your breathing or lungs) or if you participate in competitive or strenuous activity or have a history of heart disease: Please see your primary doctor/provider prior to return to activity/competition.    COVID treatments such as antiviral medications are available. They are recommended for those patients who have a risk for developing more severe COVID illness. Age is the biggest risk factor. Risk is increased for adults greater than 50 years old and particularly for adults greater than 65 years. Importantly, the treatments must be started early in the illness (within five days). These treatments may have been considered today during your visit. If you have other questions, contact your primary doctor/clinic.    You can learn more about COVID treatments from the [facility]:  [email or web address]/[email or web address]            What should I do if I am exposed to COVID?  If you are exposed to COVID, you should monitor for symptoms and test if you develop symptoms. Practicing the precautions discussed above are a good idea, particularly if you plan to be around any person who is at higher risk of COVID complications such as older patients (>65) or people with significant medical problems.            Return to the Emergency Department if:  If you are developing worsening breathing, weakness, or feel worse you should seek medical attention.  If you are uncertain, contact your health care provider/clinic. If you need emergency medical attention, call 911.

## 2024-12-04 NOTE — ED PROVIDER NOTES
ED APC SUPERVISION NOTE:   I evaluated this patient in conjunction with Mauri Landeros PA-C  I have participated in the care of the patient and personally performed key elements of the history, exam, and medical decision making.      HPI:   Melody Ramos is a 39 year old female who presents with worsening cough and shortness of breath. She reports she was diagnosed with COVID two days ago after developing cough and shortness of breath the day prior. She was prescribed Paxlovid at  which she has not taken yet.  She reports her shortness of breath worsens with exertion. She also developed an itchy rash on her legs yesterday. She took Benadryl and the rash improved. The rash is not present on her bilateral forearms. She has a similar rash in the past after giving birth which cleared after a course of steroids. No rash on her mouth or hands. She denies personal history of blood clots, recent surgery, or hormone use. She denies history of asthma.     Independent Historian:   None    Review of External Notes: ***      EXAM:   ***    Independent Interpretation (X-rays, CTs, rhythm strip):  CXR: {CXR EPPA:960800}.    Assessments/Consultations/Discussion of Management or Tests:  0957 I discussed the patient's history with Mauri Landeros PA-C.   1310 I obtained the patient's history and examined as noted above.      MEDICAL DECISION MAKING/ASSESSMENT AND PLAN:   ***      DIAGNOSIS:     ICD-10-CM    1. COVID-19  U07.1       2. Pneumonia due to 2019 novel coronavirus  U07.1     J12.82         Scribe Disclosure:  I, Connie Nair, am serving as a scribe at 9:48 AM on 12/4/2024 to document services personally performed by Jonnathan Israel MD based on my observations and the provider's statements to me.        12/4/2024  [facility] EMERGENCY DEPT

## 2024-12-04 NOTE — ED PROVIDER NOTES
Emergency Department Note      History of Present Illness     Chief Complaint   Shortness of Breath and Rash      HPI   Melody Ramos is a 39 year old female with history of PCOS, anxiety and hypertension who presents with complaint of shortness of breath.  Patient states symptoms started 3 days ago.  Initial symptoms were a cough and shortness of breath with exertion.  She went to an urgent care 2 days ago, was diagnosed with COVID-19.  She was initially prescribed Paxlovid however her insurance denied coverage for the medication, she has not yet started taking.  Shortness of breath with exertion has worsened over the last 2 days.  She also complains of a pruritic rash on her bilateral lower legs and arms.  She took Benadryl yesterday, pruritus and rash resolved on her legs, remain on her upper arms however improved.  She has been taking ibuprofen for body aches.  She denies use of oral contraceptives, history of cancer or cancer treatment, history of hospitalization or recent travels.  She also denies chest pain, abdominal discomfort, nausea and vomiting, dysuria or hematuria, diarrhea or constipation.    Independent Historian   None    Review of External Notes   12/2/2024 urgent care visit, COVID-19 positive.    Past Medical History     Medical History and Problem List   Past Medical History:   Diagnosis Date    Anxiety     Essential hypertension     Morbid obesity with BMI of 50.0-59.9, adult (H)     Sleep apnea        Medications   albuterol (PROAIR HFA/PROVENTIL HFA/VENTOLIN HFA) 108 (90 Base) MCG/ACT inhaler  azithromycin (ZITHROMAX Z-RISSA) 250 MG tablet  levalbuterol (XOPENEX HFA) 45 MCG/ACT inhaler  nirmatrelvir and ritonavir (PAXLOVID) 150 mg/100 mg therapy pack  acetaminophen (TYLENOL) 325 MG tablet  FLUoxetine (PROZAC) 40 MG capsule  ibuprofen (ADVIL/MOTRIN) 200 MG tablet  NIFEdipine ER (ADALAT CC) 30 MG 24 hr tablet  Prenatal Vit-Fe Fumarate-FA (PRENATAL MULTIVITAMIN W/IRON) 27-0.8 MG  "tablet        Surgical History   Past Surgical History:   Procedure Laterality Date    wisdom teeth         Physical Exam     Patient Vitals for the past 24 hrs:   BP Temp Temp src Pulse Resp SpO2 Height Weight   12/04/24 1329 136/89 -- -- 82 -- 98 % -- --   12/04/24 0923 (!) 150/81 98.4  F (36.9  C) Oral 95 17 99 % 1.676 m (5' 6\") (!) 153.5 kg (338 lb 6.5 oz)     Physical Exam  Physical Exam:  GENERAL: Warm, dry, alert, no increased work of breathing, sitting in chair without acute distress, nontoxic-appearing, speaking full clear sentences without difficulty  HEENT: PERRL, clear conjunctiva, posterior oropharynx clear without erythema or tonsillar exudates, uvula is midline  NECK: No JVD, supple without lymphadenopathy.  No stiffness or restricted range of motion  HEART: Regular rate and rhythm, no murmur or rubs  LUNGS: CTAB, moving air well.  No crackles or wheezes are heard.  ABD: Soft, nontender, nondistended, no guarding, with good bowel sounds heard.  BACK: No CVAT, no obvious deformities  EXTREMITIES: Moves all extremities without difficulty, no calf tenderness or peripheral edema.  SKIN: Warm and dry without rash or lesions.  NEUROLOGICAL: No focal deficits.   PSYCH: Appropriate mood and affect.     Diagnostics     Lab Results   Labs Ordered and Resulted from Time of ED Arrival to Time of ED Departure   COMPREHENSIVE METABOLIC PANEL - Abnormal       Result Value    Sodium 134 (*)     Potassium 4.9      Carbon Dioxide (CO2) 21 (*)     Anion Gap 12      Urea Nitrogen 9.6      Creatinine 0.95      GFR Estimate 78      Calcium 9.3      Chloride 101      Glucose 88      Alkaline Phosphatase 80      AST 24      ALT 18      Protein Total 7.7      Albumin 4.0      Bilirubin Total 0.4     HCG QUALITATIVE PREGNANCY - Normal    hCG Serum Qualitative Negative     CBC WITH PLATELETS AND DIFFERENTIAL    WBC Count 8.1      RBC Count 4.55      Hemoglobin 12.9      Hematocrit 39.8      MCV 88      MCH 28.4      MCHC 32.4   "    RDW 13.4      Platelet Count 295      % Neutrophils 66      % Lymphocytes 20      % Monocytes 7      % Eosinophils 6      % Basophils 0      % Immature Granulocytes 0      NRBCs per 100 WBC 0      Absolute Neutrophils 5.4      Absolute Lymphocytes 1.6      Absolute Monocytes 0.6      Absolute Eosinophils 0.5      Absolute Basophils 0.0      Absolute Immature Granulocytes 0.0      Absolute NRBCs 0.0         Imaging   CT Chest w Contrast   Final Result   IMPRESSION:    1.  Patchy, somewhat nodular, groundglass opacities and consolidation   throughout both lungs, favor Covid pneumonia but recommend   radiographic follow-up to resolution.   2.  No lobar airspace consolidation, pleural effusion or pneumothorax.      SOTERO OROURKE MD            SYSTEM ID:  [id number]      XR Chest 2 Views   Preliminary Result   IMPRESSION: Patchy somewhat nodular opacities throughout both lungs   may be infectious or inflammatory, although given the somewhat nodular   appearance, neoplasm is difficult to exclude on the basis of this   radiograph. Follow-up is recommended to ensure resolution.   Alternatively chest CT could be performed for further evaluation. No   pleural effusions. Heart size and pulmonary vascularity are within   normal limits.          Independent Interpretation   CXR: Scattered infiltrates.    ED Course      Medications Administered   Medications   dexAMETHasone (DECADRON) alcohol-free oral solution 10 mg (10 mg Oral $Given 12/4/24 1015)   iopamidol (ISOVUE-370) solution 500 mL (100 mLs Intravenous $Given 12/4/24 1128)   CT Scan Flush (73 mLs Intravenous $Given 12/4/24 1128)   albuterol (PROVENTIL HFA/VENTOLIN HFA) inhaler (2 puffs Inhalation $Given 12/4/24 1327)       Procedures   Procedures     Discussion of Management   Staffed with Dr. Israel    ED Course   ED Course as of 12/04/24 1535   Wed Dec 04, 2024   9700 I evaluated and examined the patient   1104 I spoke with patient regarding chest xray, she would  like chest CT today.   1255 I reevaluated the patient, no clinical change.  Discussed results.       Additional Documentation  None    Medical Decision Making / Diagnosis     CMS Diagnoses: None    MIPS       None    [facility]   Melody Ramos is a 39 year old female with history of PCOS, anxiety and hypertension who presents with complaint of shortness of breath.  Differential includes but is not limited to pneumothorax, pulmonary embolism, pneumonia, bronchitis, and viral respiratory infection among many others.  Vitals reassuring at presentation without fever, tachycardia, or hypoxia.  On physical exam, lungs are clear and equal bilaterally without wheezing or rhonchi, and patient was not toxic appearing.  I considered pulmonary embolism however patient was low risk Wells score and PERC negative effectively ruling out a PE.  There was no leukocytosis and no anemia.  There were no significant metabolic derangements.  Chest x-ray was significant for patchy nodular opacities throughout both lungs either infectious or inflammatory, radiologist recommended either a chest CT today or follow-up chest x-ray to ensure resolution.  There is no pneumothorax nor other acute findings.  I discussed with patient, she prefers to receive chest CT today.  Chest CT demonstrates patchy, groundglass opacities and consolidations throughout both lungs consistent with a COVID-pneumonia.  There was once again recommendation for follow-up imaging to ensure resolution.  I discussed findings with patient and recommended antibiotics as well as albuterol for symptomatic relief of shortness of breath.  Patient has been having difficulty Paxlovid covered by her insurance.  I offered to prescribe again with a diagnosis of COVID-pneumonia.  Prescriptions were sent to her pharmacy of choice.  Return precautions were also thoroughly discussed.  Patient states she understands and agrees with all plans and was subsequently discharged in stable  condition.      The patient was discharged.     Diagnosis     ICD-10-CM    1. COVID-19  U07.1       2. Pneumonia due to 2019 novel coronavirus  U07.1     J12.82            Discharge Medications   Discharge Medication List as of 12/4/2024  1:19 PM        START taking these medications    Details   albuterol (PROAIR HFA/PROVENTIL HFA/VENTOLIN HFA) 108 (90 Base) MCG/ACT inhaler Inhale 2 puffs into the lungs every 6 hours as needed for shortness of breath, wheezing or cough., Disp-18 g, R-0, E-PrescribePharmacy may dispense brand covered by insurance (Proair, or proventil or ventolin or generic albuterol inhaler)      azithromycin (ZITHROMAX Z-RISSA) 250 MG tablet Two tablets on the first day, then one tablet daily for the next 4 days, Disp-6 tablet, R-0, E-Prescribe      levalbuterol (XOPENEX HFA) 45 MCG/ACT inhaler Inhale 2 puffs into the lungs every 4 hours as needed for shortness of breath or wheezing., Disp-15 g, R-0, E-Prescribe      nirmatrelvir and ritonavir (PAXLOVID) 150 mg/100 mg therapy pack Take 2 tablets by mouth 2 times daily for 5 days., Disp-20 tablet, R-0, E-PrescribeDate of symptom onset: 12/2; Risk criteria met: Yes; Weight >40 kg Yes; Renal fxn: normal;  Drug-Drug interactions reviewed & addressed: Yes               MADAI Street John, PA-C  12/04/24 5141

## 2024-12-10 ENCOUNTER — OFFICE VISIT (OUTPATIENT)
Dept: FAMILY MEDICINE | Facility: CLINIC | Age: 39
End: 2024-12-10
Payer: COMMERCIAL

## 2024-12-10 VITALS
TEMPERATURE: 98.8 F | DIASTOLIC BLOOD PRESSURE: 86 MMHG | BODY MASS INDEX: 45.99 KG/M2 | SYSTOLIC BLOOD PRESSURE: 124 MMHG | RESPIRATION RATE: 18 BRPM | OXYGEN SATURATION: 99 % | HEIGHT: 67 IN | HEART RATE: 96 BPM | WEIGHT: 293 LBS

## 2024-12-10 DIAGNOSIS — B09 VIRAL RASH: ICD-10-CM

## 2024-12-10 DIAGNOSIS — U07.1 PNEUMONIA DUE TO 2019 NOVEL CORONAVIRUS: Primary | ICD-10-CM

## 2024-12-10 DIAGNOSIS — U07.1 INFECTION DUE TO 2019 NOVEL CORONAVIRUS: ICD-10-CM

## 2024-12-10 DIAGNOSIS — J12.82 PNEUMONIA DUE TO 2019 NOVEL CORONAVIRUS: Primary | ICD-10-CM

## 2024-12-10 PROCEDURE — 99203 OFFICE O/P NEW LOW 30 MIN: CPT | Performed by: NURSE PRACTITIONER

## 2024-12-10 RX ORDER — PREDNISONE 20 MG/1
40 TABLET ORAL DAILY
Qty: 10 TABLET | Refills: 0 | Status: SHIPPED | OUTPATIENT
Start: 2024-12-10 | End: 2024-12-15

## 2024-12-10 RX ORDER — HYDROXYZINE HYDROCHLORIDE 25 MG/1
25 TABLET, FILM COATED ORAL 3 TIMES DAILY PRN
Qty: 30 TABLET | Refills: 0 | Status: SHIPPED | OUTPATIENT
Start: 2024-12-10

## 2024-12-10 ASSESSMENT — PAIN SCALES - GENERAL: PAINLEVEL_OUTOF10: NO PAIN (0)

## 2024-12-10 NOTE — PROGRESS NOTES
"  Assessment & Plan     Pneumonia due to 2019 novel coronavirus  Infection due to 2019 novel coronavirus  Overall improving, follow up CXR next week. Prednisone for ongoing SOB and rash.  - predniSONE (DELTASONE) 20 MG tablet  Dispense: 10 tablet; Refill: 0  - XR Chest 2 Views    Viral rash  - predniSONE (DELTASONE) 20 MG tablet  Dispense: 10 tablet; Refill: 0  - hydrOXYzine HCl (ATARAX) 25 MG tablet  Dispense: 30 tablet; Refill: 0          MED REC REQUIRED  Post Medication Reconciliation Status: discharge medications reconciled and changed, per note/orders  BMI  Estimated body mass index is 52.26 kg/m  as calculated from the following:    Height as of this encounter: 1.702 m (5' 7\").    Weight as of this encounter: 151.4 kg (333 lb 11.2 oz).   Weight management plan: Discussed healthy diet and exercise guidelines      See Patient Instructions    Zeferino Oliver is a 39 year old, presenting for the following health issues:  Shortness of Breath        12/10/2024     6:52 AM   Additional Questions   Roomed by Alfreda COELHO   Accompanied by Self     HPI       ED/UC Followup:    Facility:  [facility] Emergency [facility]   Date of visit: 12/4  Reason for visit: Cough and shortness of breath  Current Status: Consistent chest pain and shortness of breath. Rash traveling   Rash  Onset/Duration: Week and a day   Description  Location: traveling   Character: round, red  Itching: severe  Intensity:  severe  Progression of Symptoms:  worsening  Accompanying signs and symptoms:   Fever: No  Body aches or joint pain: No  Sore throat symptoms: No  Recent cold symptoms: YES  History:           Previous episodes of similar rash: None  New exposures:  None  Recent travel: No  Exposure to similar rash: No  Precipitating or alleviating factors: none  Therapies tried and outcome: topical steroid -     Overall improving but still having fatigue and some SOB with activity. No cough, wheezing. Rash is most bothersome, all over body, " "itchy, papular.    Review of Systems  Constitutional, HEENT, cardiovascular, pulmonary, GI, , musculoskeletal, neuro, skin, endocrine and psych systems are negative, except as otherwise noted.      Objective    /86 (BP Location: Right arm, Patient Position: Sitting, Cuff Size: Adult Regular)   Pulse 96   Temp 98.8  F (37.1  C) (Tympanic)   Resp 18   Ht 1.702 m (5' 7\")   Wt (!) 151.4 kg (333 lb 11.2 oz)   LMP 11/19/2024   SpO2 99%   BMI 52.26 kg/m    Body mass index is 52.26 kg/m .  Physical Exam   GENERAL: alert and no distress  NECK: no adenopathy, no asymmetry, masses, or scars  RESP: lungs clear to auscultation - no rales, rhonchi or wheezes  CV: regular rate and rhythm, normal S1 S2, no S3 or S4, no murmur, click or rub, no peripheral edema  ABDOMEN: soft, nontender, no hepatosplenomegaly, no masses and bowel sounds normal  MS: no gross musculoskeletal defects noted, no edema  SKIN: no suspicious lesions or rashes and papule - generalized    Future CXR ordered. Reviewed imaging from ED.        Signed Electronically by: Eloise Willard CNP    "

## 2024-12-15 ENCOUNTER — APPOINTMENT (OUTPATIENT)
Dept: GENERAL RADIOLOGY | Facility: CLINIC | Age: 39
End: 2024-12-15
Attending: EMERGENCY MEDICINE
Payer: COMMERCIAL

## 2024-12-15 ENCOUNTER — HOSPITAL ENCOUNTER (EMERGENCY)
Facility: CLINIC | Age: 39
Discharge: HOME | End: 2024-12-15
Attending: EMERGENCY MEDICINE | Admitting: EMERGENCY MEDICINE
Payer: COMMERCIAL

## 2024-12-15 VITALS
WEIGHT: 293 LBS | OXYGEN SATURATION: 98 % | BODY MASS INDEX: 45.99 KG/M2 | DIASTOLIC BLOOD PRESSURE: 80 MMHG | TEMPERATURE: 98.5 F | HEIGHT: 67 IN | SYSTOLIC BLOOD PRESSURE: 145 MMHG | HEART RATE: 94 BPM | RESPIRATION RATE: 16 BRPM

## 2024-12-15 DIAGNOSIS — U07.1 COVID-19 VIRUS INFECTION: ICD-10-CM

## 2024-12-15 LAB
ANION GAP SERPL CALCULATED.3IONS-SCNC: 15 MMOL/L (ref 7–15)
BASOPHILS # BLD AUTO: 0 10E3/UL (ref 0–0.2)
BASOPHILS NFR BLD AUTO: 1 %
BUN SERPL-MCNC: 9 MG/DL (ref 6–20)
CALCIUM SERPL-MCNC: 9.3 MG/DL (ref 8.8–10.4)
CHLORIDE SERPL-SCNC: 101 MMOL/L (ref 98–107)
CREAT SERPL-MCNC: 0.93 MG/DL (ref 0.51–0.95)
D DIMER PPP FEU-MCNC: 0.4 UG/ML FEU (ref 0–0.5)
EGFRCR SERPLBLD CKD-EPI 2021: 80 ML/MIN/1.73M2
EOSINOPHIL # BLD AUTO: 0.5 10E3/UL (ref 0–0.7)
EOSINOPHIL NFR BLD AUTO: 8 %
ERYTHROCYTE [DISTWIDTH] IN BLOOD BY AUTOMATED COUNT: 13.3 % (ref 10–15)
GLUCOSE SERPL-MCNC: 110 MG/DL (ref 70–99)
HCG SERPL QL: NEGATIVE
HCO3 SERPL-SCNC: 20 MMOL/L (ref 22–29)
HCT VFR BLD AUTO: 42.2 % (ref 35–47)
HGB BLD-MCNC: 14.1 G/DL (ref 11.7–15.7)
HOLD SPECIMEN: NORMAL
IMM GRANULOCYTES # BLD: 0 10E3/UL
IMM GRANULOCYTES NFR BLD: 0 %
LYMPHOCYTES # BLD AUTO: 1.9 10E3/UL (ref 0.8–5.3)
LYMPHOCYTES NFR BLD AUTO: 30 %
MCH RBC QN AUTO: 28.4 PG (ref 26.5–33)
MCHC RBC AUTO-ENTMCNC: 33.4 G/DL (ref 31.5–36.5)
MCV RBC AUTO: 85 FL (ref 78–100)
MONOCYTES # BLD AUTO: 0.5 10E3/UL (ref 0–1.3)
MONOCYTES NFR BLD AUTO: 7 %
NEUTROPHILS # BLD AUTO: 3.3 10E3/UL (ref 1.6–8.3)
NEUTROPHILS NFR BLD AUTO: 54 %
NRBC # BLD AUTO: 0 10E3/UL
NRBC BLD AUTO-RTO: 0 /100
PLATELET # BLD AUTO: 274 10E3/UL (ref 150–450)
POTASSIUM SERPL-SCNC: 3.4 MMOL/L (ref 3.4–5.3)
PROCALCITONIN SERPL IA-MCNC: 0.14 NG/ML
RBC # BLD AUTO: 4.97 10E6/UL (ref 3.8–5.2)
SODIUM SERPL-SCNC: 136 MMOL/L (ref 135–145)
TROPONIN T SERPL HS-MCNC: <6 NG/L
WBC # BLD AUTO: 6.2 10E3/UL (ref 4–11)

## 2024-12-15 PROCEDURE — 84145 PROCALCITONIN (PCT): CPT | Performed by: EMERGENCY MEDICINE

## 2024-12-15 PROCEDURE — 84703 CHORIONIC GONADOTROPIN ASSAY: CPT | Performed by: EMERGENCY MEDICINE

## 2024-12-15 PROCEDURE — 93005 ELECTROCARDIOGRAM TRACING: CPT

## 2024-12-15 PROCEDURE — 85004 AUTOMATED DIFF WBC COUNT: CPT | Performed by: EMERGENCY MEDICINE

## 2024-12-15 PROCEDURE — 84484 ASSAY OF TROPONIN QUANT: CPT | Performed by: EMERGENCY MEDICINE

## 2024-12-15 PROCEDURE — 71046 X-RAY EXAM CHEST 2 VIEWS: CPT

## 2024-12-15 PROCEDURE — 99285 EMERGENCY DEPT VISIT HI MDM: CPT | Mod: 25

## 2024-12-15 PROCEDURE — 36415 COLL VENOUS BLD VENIPUNCTURE: CPT | Performed by: EMERGENCY MEDICINE

## 2024-12-15 PROCEDURE — 80048 BASIC METABOLIC PNL TOTAL CA: CPT | Performed by: EMERGENCY MEDICINE

## 2024-12-15 PROCEDURE — 250N000013 HC RX MED GY IP 250 OP 250 PS 637: Performed by: EMERGENCY MEDICINE

## 2024-12-15 PROCEDURE — 85379 FIBRIN DEGRADATION QUANT: CPT | Performed by: EMERGENCY MEDICINE

## 2024-12-15 PROCEDURE — 82565 ASSAY OF CREATININE: CPT | Performed by: EMERGENCY MEDICINE

## 2024-12-15 RX ORDER — BENZONATATE 200 MG/1
200 CAPSULE ORAL 3 TIMES DAILY PRN
Qty: 30 CAPSULE | Refills: 0 | Status: SHIPPED | OUTPATIENT
Start: 2024-12-15

## 2024-12-15 RX ORDER — BENZONATATE 100 MG/1
200 CAPSULE ORAL ONCE
Status: COMPLETED | OUTPATIENT
Start: 2024-12-15 | End: 2024-12-15

## 2024-12-15 RX ORDER — DEXAMETHASONE 4 MG/1
6 TABLET ORAL
Qty: 8 TABLET | Refills: 0 | Status: SHIPPED | OUTPATIENT
Start: 2024-12-15 | End: 2024-12-20

## 2024-12-15 RX ADMIN — BENZONATATE 200 MG: 100 CAPSULE ORAL at 10:23

## 2024-12-15 ASSESSMENT — COLUMBIA-SUICIDE SEVERITY RATING SCALE - C-SSRS
2. HAVE YOU ACTUALLY HAD ANY THOUGHTS OF KILLING YOURSELF IN THE PAST MONTH?: NO
6. HAVE YOU EVER DONE ANYTHING, STARTED TO DO ANYTHING, OR PREPARED TO DO ANYTHING TO END YOUR LIFE?: NO
1. IN THE PAST MONTH, HAVE YOU WISHED YOU WERE DEAD OR WISHED YOU COULD GO TO SLEEP AND NOT WAKE UP?: NO

## 2024-12-15 ASSESSMENT — ACTIVITIES OF DAILY LIVING (ADL)
ADLS_ACUITY_SCORE: 46
ADLS_ACUITY_SCORE: 46

## 2024-12-15 NOTE — ED TRIAGE NOTES
Pt was dx w/ pneumonia two wks ago and was starting to feel better w/ meds. Pt states over the weekend her symptoms got worse again. Pt endorses cough, sob, chest pain.

## 2024-12-15 NOTE — DISCHARGE INSTRUCTIONS
We suspect ongoing cough and not feeling well from COVID-19.  Your x-ray showed resolution of your pneumonia.  There is no suggestion of superinfection your white blood cell count and procalcitonin are negative.  We recommend treating you with a low-dose steroid to continue treatment for COVID-19 as well as cough medication please return with severe increase in shortness of breath or low sats on your pulse oximeter.  Thanks for your patience and we hope you feel better.

## 2024-12-15 NOTE — ED PROVIDER NOTES
"  Emergency Department Note      History of Present Illness     Chief Complaint   Chest Pain and Shortness of Breath    HPI   Melody Ramos is a 39 year old female with a history of hypertension who presents to the ED for shortness of breath and chest pain. Two weeks ago, the patient was seen in the ED for similar symptoms. She was diagnosed with pneumonia and prescribed a five day course of azithromycin. After the antibiotics, she felt better; however, over the weekend she began to experience difficulty breathing and a cough. She states that her lungs hurt when she coughs or takes a breath. She also endorses decreased appetite and vomiting. She denies history of blood clots. No leg pain or swelling. No tobacco use.     Independent Historian   None    Review of External Notes       Past Medical History   Medical History and Problem List   Obesity  PCOS  Sleep apnea  Anxiety  Hypertension    Medications   Albuterol  Levalbuterol  Hydroxyzine    Surgical History   [place] teeth extraction     Physical Exam   Patient Vitals for the past 24 hrs:   BP Temp Temp src Pulse Resp SpO2 Height Weight   12/15/24 0946 (!) 165/100 98.5  F (36.9  C) Oral 99 17 100 % 1.702 m (5' 7\") (!) 150.6 kg (332 lb 0.2 oz)     Physical Exam  Vitals reviewed.   Cardiovascular:      Rate and Rhythm: Normal rate and regular rhythm.      Heart sounds: Normal heart sounds.   Pulmonary:      Effort: Pulmonary effort is normal.      Breath sounds: Normal breath sounds. No decreased breath sounds or wheezing.   Musculoskeletal:      Cervical back: Normal range of motion.   Skin:     General: Skin is warm.      Capillary Refill: Capillary refill takes less than 2 seconds.   Neurological:      General: No focal deficit present.      Mental Status: She is alert.   Psychiatric:         Mood and Affect: Mood is anxious.           Diagnostics   Lab Results   Labs Ordered and Resulted from Time of ED Arrival to Time of ED Departure   BASIC METABOLIC PANEL " - Abnormal       Result Value    Sodium 136      Potassium 3.4      Chloride 101      Carbon Dioxide (CO2) 20 (*)     Anion Gap 15      Urea Nitrogen 9.0      Creatinine 0.93      GFR Estimate 80      Calcium 9.3      Glucose 110 (*)    TROPONIN T, HIGH SENSITIVITY - Normal    Troponin T, High Sensitivity <6     HCG QUALITATIVE PREGNANCY - Normal    hCG Serum Qualitative Negative     D DIMER QUANTITATIVE - Normal    D-Dimer Quantitative 0.40     PROCALCITONIN - Normal    Procalcitonin 0.14     CBC WITH PLATELETS AND DIFFERENTIAL    WBC Count 6.2      RBC Count 4.97      Hemoglobin 14.1      Hematocrit 42.2      MCV 85      MCH 28.4      MCHC 33.4      RDW 13.3      Platelet Count 274      % Neutrophils 54      % Lymphocytes 30      % Monocytes 7      % Eosinophils 8      % Basophils 1      % Immature Granulocytes 0      NRBCs per 100 WBC 0      Absolute Neutrophils 3.3      Absolute Lymphocytes 1.9      Absolute Monocytes 0.5      Absolute Eosinophils 0.5      Absolute Basophils 0.0      Absolute Immature Granulocytes 0.0      Absolute NRBCs 0.0       Imaging   Chest XR,  PA & LAT   Final Result   IMPRESSION: Significantly improved and essentially resolved bilateral groundglass opacities since 12/4/2024. No new focal consolidation, pleural effusion or pneumothorax.        EKG   ECG taken at 0955, ECG read at 1020  Normal sinus rhythm  Inferior infarct, age undetermined  Abnormal ECG   Rate 74 bpm. RI interval 148 ms. QRS duration 84 ms. QT/QTc 392/435 ms. P-R-T axes 19 12 10.    Independent Interpretation   None    ED Course    Medications Administered   Medications   benzonatate (TESSALON) capsule 200 mg (200 mg Oral $Given 12/15/24 1023)     Procedures   Procedures     Discussion of Management   None    ED Course   ED Course as of 12/15/24 1240   Sun Dec 15, 2024   1016 I obtained history and examined the patient as noted above.     1240 The patient is comfortable with plan for discharge.       Additional  Documentation  None    Medical Decision Making / Diagnosis   CMS Diagnoses: None    MIPS       None    [facility]   Melody Michelle Ramos is a 39 year old female presents with ongoing cough recent treatment with antibiotics due to infiltrates on CT scan but likely COVID-19 related continues to cough sats are normal respiratory rate normal repeat imaging shows no complicated pleural effusion or worsening infiltrate.  Lab work is unremarkable.  Suspect ongoing cough from COVID.  Offered low-dose steroid as well as cough medication and follow-up as an outpatient.    Disposition   The patient was discharged.     Diagnosis     ICD-10-CM    1. COVID-19 virus infection  U07.1          Discharge Medications   Discharge Medication List as of 12/15/2024 12:42 PM        START taking these medications    Details   benzonatate (TESSALON) 200 MG capsule Take 1 capsule (200 mg) by mouth 3 times daily as needed for cough., Disp-30 capsule, R-0, E-Prescribe      dexAMETHasone (DECADRON) 4 MG tablet Take 1.5 tablets (6 mg) by mouth daily (with breakfast) for 5 days., Disp-8 tablet, R-0, E-Prescribe           Scribe Disclosure:  Case [id number], am serving as a scribe at 10:21 AM on 12/15/2024 to document services personally performed by Jonnathan Vallejo MD based on my observations and the provider's statements to me.        Jonnathan Vallejo MD  12/18/24 [id number]

## 2024-12-16 LAB
ATRIAL RATE - MUSE: 74 BPM
DIASTOLIC BLOOD PRESSURE - MUSE: NORMAL MMHG
INTERPRETATION ECG - MUSE: NORMAL
P AXIS - MUSE: 19 DEGREES
PR INTERVAL - MUSE: 148 MS
QRS DURATION - MUSE: 84 MS
QT - MUSE: 392 MS
QTC - MUSE: 435 MS
R AXIS - MUSE: 12 DEGREES
SYSTOLIC BLOOD PRESSURE - MUSE: NORMAL MMHG
T AXIS - MUSE: 10 DEGREES
VENTRICULAR RATE- MUSE: 74 BPM

## 2025-03-31 NOTE — DISCHARGE INSTRUCTIONS
Postpartum Vaginal Delivery Instructions    Activity     Ask family and friends for help when you need it.  Do not place anything in your vagina for 6 weeks.  You are not restricted on other activities, but take it easy for a few weeks to allow your body to recover from delivery.  You are able to do any activities you feel up to that point.  No driving until you have stopped taking your pain medications (usually two weeks after delivery).     Call your health care provider if you have any of these symptoms:     Increased pain, swelling, redness, or fluid around your stiches from an episiotomy or perineal tear.  A fever above 100.4 F (38 C) with or without chills when placing a thermometer under your tongue.  You soak a sanitary pad with blood within 1 hour, or you see blood clots larger than a golf ball.  Bleeding that lasts more than 6 weeks.  Vaginal discharge that smells bad.  Severe pain, cramping or tenderness in your lower belly area.  A need to urinate more frequently (use the toilet more often), more urgently (use the toilet very quickly), or it burns when you urinate.  Nausea and vomiting.  Redness, swelling or pain around a vein in your leg.  Problems breastfeeding or a red or painful area on your breast.  Chest pain and cough or are gasping for air.  Problems coping with sadness, anxiety, or depression.  If you have any concerns about hurting yourself or the baby, call your provider immediately.   You have questions or concerns after you return home.     Keep your hands clean:  Always wash your hands before touching your perineal area and stitches.  This helps reduce your risk of infection.  If your hands aren't dirty, you may use an alcohol hand-rub to clean your hands. Keep your nails clean and short.        Warning Signs after Having a Baby    Keep this paper on your fridge or somewhere else where you can see it.    Call your provider if you have any of these symptoms up to 12 weeks after having your  Verónica Bender is a 50 year old female presenting for   Chief Complaint   Patient presents with    Office Visit    Anxiety     Virgie Diaz, RN    3/27/25  3:37 PM  Note     Called pt to discuss anxiety concerns  Doesn't know really what's going on or what is causing her to feel more anxious  Last couple weeks feeling \"off\" almost panicky   Feels like she is spacing out - starts something and then her mind wanders and ends up doing something else  Feels like she is out of control when getting tasks done  Endorses heart feels likes its racing  No changes in ADLs   No changes in dosage of sertraline   Has hydroxyzine but does not like taking them during the day as   Denies thought and plans of SH SI HI  Denies AVH        Care advice given. Patient verbalized understanding. Offered sooner appt with Dr Sullivan. Appt made for 3/31/25 at 0800 with PCP     See nurse triage protocol     Reason for Disposition   Panic attacks are increasing in frequency    Protocols used: Anxiety and Panic Attack-A-OH        Changes since last spoke on phone w/triage nurse?: no changes     Questions/Concerns: none    Preferred method of results communication:   Cell Phone:   Telephone Information:   Mobile [phone]     Okay to leave a message containing results? Yes    Denies Latex allergy or sensitivity.    Medication verified and med list updated  Refills needed today: No       Health Maintenance       Colorectal Cancer Screening (View Topic Details)  Order placed this encounter    COVID-19 Vaccine (4 - 2024-25 season)  Overdue since 9/1/2024           Following review of the above:  Pended orders  Patient is not proceeding with: COVID-19    Note: Refer to final orders and clinician documentation.          Depression Screening:  Review Flowsheet  More data exists         3/31/2025   PHQ 2/9 Score   Adult PHQ 2 Score 2   Adult PHQ 2 Interpretation No further screening needed   Little interest or pleasure in activity? Several  baby.    Thoughts of hurting yourself or your baby  Pain in your chest or trouble breathing  Severe headache not helped by pain medicine  Eyesight concerns (blurry vision, seeing spots or flashes of light, other changes to eyesight)  Fainting, shaking or other signs of a seizure    Call 9-1-1 if you feel that it is an emergency.     The symptoms below can happen to anyone after giving birth. They can be very serious. Call your provider if you have any of these warning signs.    My provider s phone number: [phone]    Losing too much blood (hemorrhage)    Call your provider if you soak through a pad in less than an hour or pass blood clots bigger than a golf ball. These may be signs that you are bleeding too much.    Blood clots in the legs or lungs    After you give birth, your body naturally clots its blood to help prevent blood loss. Sometimes this increased clotting can happen in other areas of the body, like the legs or lungs. This can block your blood flow and be very dangerous.     Call your provider if you:  Have a red, swollen spot on the back of your leg that is warm or painful when you touch it.   Are coughing up blood.     Infection    Call your provider if you have any of these symptoms:  Fever of 100.4 F (38 C) or higher.  Pain or redness around your stitches if you had an incision.   Any yellow, white, or green fluid coming from places where you had stitches or surgery.    Mood Problems (postpartum depression)    Many people feel sad or have mood changes after having a baby. But for some people, these mood swings are worse.     Call your provider right away if you feel so anxious or nervous that you can't care for yourself or your baby.    Preeclampsia (high blood pressure)    Even if you didn't have high blood pressure when you were pregnant, you are at risk for the high blood pressure disease called preeclampsia. This risk can last up to 12 weeks after giving birth.     Call your provider if you  days   Feeling down, depressed or hopeless? Several days      Details                     have:   Pain on your right side under your rib cage  Sudden swelling in the hands and face    Remember: You know your body. If something doesn't feel right, get medical help.     For informational purposes only. Not to replace the advice of your health care provider. Copyright 2020 [employer]. All rights reserved. Clinically reviewed by Geovanna Garces RNC-OB, MSN. [employer] [id number] - Rev 02/23.

## 2025-07-13 ENCOUNTER — APPOINTMENT (OUTPATIENT)
Dept: GENERAL RADIOLOGY | Facility: CLINIC | Age: 40
End: 2025-07-13
Attending: EMERGENCY MEDICINE
Payer: COMMERCIAL

## 2025-07-13 ENCOUNTER — HOSPITAL ENCOUNTER (EMERGENCY)
Facility: CLINIC | Age: 40
Discharge: HOME | End: 2025-07-13
Attending: EMERGENCY MEDICINE
Payer: COMMERCIAL

## 2025-07-13 VITALS
WEIGHT: 293 LBS | TEMPERATURE: 97.9 F | OXYGEN SATURATION: 100 % | HEIGHT: 66 IN | BODY MASS INDEX: 47.09 KG/M2 | RESPIRATION RATE: 22 BRPM | DIASTOLIC BLOOD PRESSURE: 112 MMHG | HEART RATE: 63 BPM | SYSTOLIC BLOOD PRESSURE: 155 MMHG

## 2025-07-13 DIAGNOSIS — R51.9 NONINTRACTABLE HEADACHE, UNSPECIFIED CHRONICITY PATTERN, UNSPECIFIED HEADACHE TYPE: ICD-10-CM

## 2025-07-13 DIAGNOSIS — R07.9 CHEST PAIN, UNSPECIFIED TYPE: ICD-10-CM

## 2025-07-13 LAB
ANION GAP SERPL CALCULATED.3IONS-SCNC: 11 MMOL/L (ref 7–15)
BASOPHILS # BLD AUTO: 0 10E3/UL (ref 0–0.2)
BASOPHILS NFR BLD AUTO: 0 %
BUN SERPL-MCNC: 12.9 MG/DL (ref 6–20)
CALCIUM SERPL-MCNC: 9.3 MG/DL (ref 8.8–10.4)
CHLORIDE SERPL-SCNC: 104 MMOL/L (ref 98–107)
CREAT SERPL-MCNC: 0.97 MG/DL (ref 0.51–0.95)
EGFRCR SERPLBLD CKD-EPI 2021: 76 ML/MIN/1.73M2
EOSINOPHIL # BLD AUTO: 0.2 10E3/UL (ref 0–0.7)
EOSINOPHIL NFR BLD AUTO: 2 %
ERYTHROCYTE [DISTWIDTH] IN BLOOD BY AUTOMATED COUNT: 13.2 % (ref 10–15)
GLUCOSE SERPL-MCNC: 93 MG/DL (ref 70–99)
HCO3 SERPL-SCNC: 25 MMOL/L (ref 22–29)
HCT VFR BLD AUTO: 37.1 % (ref 35–47)
HGB BLD-MCNC: 12.1 G/DL (ref 11.7–15.7)
HOLD SPECIMEN: NORMAL
HOLD SPECIMEN: NORMAL
IMM GRANULOCYTES # BLD: 0 10E3/UL
IMM GRANULOCYTES NFR BLD: 0 %
LYMPHOCYTES # BLD AUTO: 2.3 10E3/UL (ref 0.8–5.3)
LYMPHOCYTES NFR BLD AUTO: 33 %
MCH RBC QN AUTO: 28.9 PG (ref 26.5–33)
MCHC RBC AUTO-ENTMCNC: 32.6 G/DL (ref 31.5–36.5)
MCV RBC AUTO: 89 FL (ref 78–100)
MONOCYTES # BLD AUTO: 0.5 10E3/UL (ref 0–1.3)
MONOCYTES NFR BLD AUTO: 6 %
NEUTROPHILS # BLD AUTO: 4.1 10E3/UL (ref 1.6–8.3)
NEUTROPHILS NFR BLD AUTO: 58 %
NRBC # BLD AUTO: 0 10E3/UL
NRBC BLD AUTO-RTO: 0 /100
PLATELET # BLD AUTO: 307 10E3/UL (ref 150–450)
POTASSIUM SERPL-SCNC: 3.5 MMOL/L (ref 3.4–5.3)
RBC # BLD AUTO: 4.19 10E6/UL (ref 3.8–5.2)
SODIUM SERPL-SCNC: 140 MMOL/L (ref 135–145)
TROPONIN T SERPL HS-MCNC: <6 NG/L
WBC # BLD AUTO: 7.1 10E3/UL (ref 4–11)

## 2025-07-13 PROCEDURE — 250N000011 HC RX IP 250 OP 636: Performed by: EMERGENCY MEDICINE

## 2025-07-13 PROCEDURE — 80048 BASIC METABOLIC PNL TOTAL CA: CPT | Performed by: EMERGENCY MEDICINE

## 2025-07-13 PROCEDURE — 99291 CRITICAL CARE FIRST HOUR: CPT | Mod: 25 | Performed by: EMERGENCY MEDICINE

## 2025-07-13 PROCEDURE — 84484 ASSAY OF TROPONIN QUANT: CPT | Performed by: EMERGENCY MEDICINE

## 2025-07-13 PROCEDURE — 96374 THER/PROPH/DIAG INJ IV PUSH: CPT

## 2025-07-13 PROCEDURE — 85025 COMPLETE CBC W/AUTO DIFF WBC: CPT | Performed by: EMERGENCY MEDICINE

## 2025-07-13 PROCEDURE — 99285 EMERGENCY DEPT VISIT HI MDM: CPT | Mod: 25 | Performed by: EMERGENCY MEDICINE

## 2025-07-13 PROCEDURE — 36415 COLL VENOUS BLD VENIPUNCTURE: CPT | Performed by: EMERGENCY MEDICINE

## 2025-07-13 PROCEDURE — 250N000013 HC RX MED GY IP 250 OP 250 PS 637: Performed by: EMERGENCY MEDICINE

## 2025-07-13 PROCEDURE — 93005 ELECTROCARDIOGRAM TRACING: CPT

## 2025-07-13 PROCEDURE — 71046 X-RAY EXAM CHEST 2 VIEWS: CPT

## 2025-07-13 RX ORDER — ACETAMINOPHEN 500 MG
1000 TABLET ORAL ONCE
Status: COMPLETED | OUTPATIENT
Start: 2025-07-13 | End: 2025-07-13

## 2025-07-13 RX ORDER — KETOROLAC TROMETHAMINE 15 MG/ML
15 INJECTION, SOLUTION INTRAMUSCULAR; INTRAVENOUS ONCE
Status: COMPLETED | OUTPATIENT
Start: 2025-07-13 | End: 2025-07-13

## 2025-07-13 RX ADMIN — KETOROLAC TROMETHAMINE 15 MG: 15 INJECTION, SOLUTION INTRAMUSCULAR; INTRAVENOUS at 20:59

## 2025-07-13 RX ADMIN — ACETAMINOPHEN 1000 MG: 500 TABLET, FILM COATED ORAL at 20:59

## 2025-07-13 ASSESSMENT — COLUMBIA-SUICIDE SEVERITY RATING SCALE - C-SSRS
2. HAVE YOU ACTUALLY HAD ANY THOUGHTS OF KILLING YOURSELF IN THE PAST MONTH?: NO
1. IN THE PAST MONTH, HAVE YOU WISHED YOU WERE DEAD OR WISHED YOU COULD GO TO SLEEP AND NOT WAKE UP?: NO
6. HAVE YOU EVER DONE ANYTHING, STARTED TO DO ANYTHING, OR PREPARED TO DO ANYTHING TO END YOUR LIFE?: NO

## 2025-07-13 ASSESSMENT — ACTIVITIES OF DAILY LIVING (ADL)
ADLS_ACUITY_SCORE: 46
ADLS_ACUITY_SCORE: 46

## 2025-07-14 LAB
ATRIAL RATE - MUSE: 80 BPM
DIASTOLIC BLOOD PRESSURE - MUSE: NORMAL MMHG
INTERPRETATION ECG - MUSE: NORMAL
P AXIS - MUSE: 50 DEGREES
PR INTERVAL - MUSE: 150 MS
QRS DURATION - MUSE: 78 MS
QT - MUSE: 374 MS
QTC - MUSE: 431 MS
R AXIS - MUSE: 23 DEGREES
SYSTOLIC BLOOD PRESSURE - MUSE: NORMAL MMHG
T AXIS - MUSE: 16 DEGREES
VENTRICULAR RATE- MUSE: 80 BPM

## 2025-07-14 NOTE — ED PROVIDER NOTES
Emergency Department Note      History of Present Illness     Chief Complaint   Shortness of Breath and Chest Pain      HPI   Melody Ramos is a 39 year old female with a history of anxiety and hypertension here for evaluation of shortness of breath and chest pain. The patient states that around 1815 she started having chest pain, shortness of breath, a headache, and a slight cough. She reports having chest tightness that is a 4/10 which does not radiated anywhere. She says that she was having a hard time catching her breath. She notes having a headache bilaterally to her jaw and side of her head that is dull and a 5/10. She denies getting headaches often. She denies taking medication for the pain. She reports feeling warm and having a dry mouth when the symptoms started. She also notes that she had sinus congestion the past 3 days. She ate fish, corn, and rice for dinner tonight. She also was started on fluoxetine for her anxiety 3 days ago. She denies having anxiety right now. No history of blood clots. No recent surgeries. No long car trips. No birth control. No coughing up blood. No vomiting, No diarrhea or constipation. No changes in urination. No dizziness or lightheadedness. No palpitations or heart racing. No changes in vision or speech. No loss of balance. No numbness or weakness to the legs or arms. No medical issues. No family history of heart disease. No history of being a smoker.     Independent Historian   None    Review of External Notes   I reviewed family medicine note from 03/16/2025. Patient was seen for cough.    Past Medical History     Medical History and Problem List   Morbid obesity  PCOS  Severe obstructive sleep apnea   Anxiety  Hypertension     Medications   Abuterol sulfate  Levalbuterol  Paxlovid  Fluoxetine     Surgical History   [place] teeth    Physical Exam     Patient Vitals for the past 24 hrs:   BP Temp Temp src Pulse Resp SpO2 Height Weight   07/13/25 2155 (!) 155/112 -- --  "63 22 100 % -- --   07/13/25 2011 (!) 144/75 97.9  F (36.6  C) Temporal 73 28 100 % 1.676 m (5' 6\") (!) 152.7 kg (336 lb 10.3 oz)     Physical Exam  GENERAL: Awake, alert  CARDIOVASCULAR: Regular rate and rhythm. No reproducible chest wall pain.    ENT: No sinus tenderness. Tenderness to TMJ bilaterally but not to temporal arteries.  NECK: No meningisus. Normal range of motion of the neck.  LUNGS: Clear bilaterally, no wheezes rales or rhonchi  ABDOMEN: Soft, nontender, no rebound or guarding  EXTREMITIES: No peripheral edema  NEUROLOGICAL: Patient is awake, face is symmetric, tongue is midline, extraocular muscles intact, no dysarthria, no dysmetria on finger-to-nose, 5 out of 5 strength throughout and sensation is normal      Diagnostics     Lab Results   Labs Ordered and Resulted from Time of ED Arrival to Time of ED Departure   BASIC METABOLIC PANEL - Abnormal       Result Value    Sodium 140      Potassium 3.5      Chloride 104      Carbon Dioxide (CO2) 25      Anion Gap 11      Urea Nitrogen 12.9      Creatinine 0.97 (*)     GFR Estimate 76      Calcium 9.3      Glucose 93     TROPONIN T, HIGH SENSITIVITY - Normal    Troponin T, High Sensitivity <6     CBC WITH PLATELETS AND DIFFERENTIAL    WBC Count 7.1      RBC Count 4.19      Hemoglobin 12.1      Hematocrit 37.1      MCV 89      MCH 28.9      MCHC 32.6      RDW 13.2      Platelet Count 307      % Neutrophils 58      % Lymphocytes 33      % Monocytes 6      % Eosinophils 2      % Basophils 0      % Immature Granulocytes 0      NRBCs per 100 WBC 0      Absolute Neutrophils 4.1      Absolute Lymphocytes 2.3      Absolute Monocytes 0.5      Absolute Eosinophils 0.2      Absolute Basophils 0.0      Absolute Immature Granulocytes 0.0      Absolute NRBCs 0.0         Imaging   Chest XR,  PA & LAT   Final Result   IMPRESSION: Heart size is normal. There is some patchy right lower lobe parenchymal opacity seen only on the frontal view, favored to represent " atelectasis. Correlate clinically to exclude pneumonia. Lungs are otherwise clear. Mediastinum and bony    structures are unremarkable.          EKG   ECG results from 07/13/25   EKG 12-lead, tracing only     Value    Systolic Blood Pressure     Diastolic Blood Pressure     Ventricular Rate 80    Atrial Rate 80    AZ Interval 150    QRS Duration 78        QTc 431    P Axis 50    R AXIS 23    T Axis 16    Interpretation ECG      Sinus rhythm  Normal ECG  When compared with ECG of 15-Dec-2024 09:55,  Criteria for Inferior infarct are no longer Present  Interpreted by me at 2010      Independent Interpretation   CXR: No infiltrate or mediastinal widening.    ED Course      Medications Administered   Medications   ketorolac (TORADOL) injection 15 mg (15 mg Intravenous $Given 7/13/25 2059)   acetaminophen (TYLENOL) tablet 1,000 mg (1,000 mg Oral $Given 7/13/25 2059)       Procedures   Procedures     Discussion of Management   None    ED Course   ED Course as of 07/14/25 1216   Sun Jul 13, 2025 2019 I obtained history and examined the patient as noted above.        Additional Documentation  None    Medical Decision Making / Diagnosis     CMS Diagnoses: None    MIPS   None      [facility]   Melody Michelle Ramos is a 39 year old female who presents for evaluation of chest pain shortness of breath and dry mouth.  Her EKG showed no ischemic changes, initial troponin less than 6 ruling her out for ACS per protocol.  CBC BMP unremarkable, chest x-ray showed no widened mediastinum or infiltrate.  She is PE RC negative do not suspect PE.  Chest pain is more pressure-like in nature.  Given dry mouth, I considered anxiety as cause for her symptoms.  She drove here, and declined any medications for this.  Her headache is mild, frontal in nature, also has some pain in the TMJ joints, may have some TMJ dysfunction, neurological exam is completely normal with no neurological symptoms do not suspect CVA, venous sinus thrombosis, bacterial  meningitis or other emergent etiology.  She is feeling better after Tylenol and ibuprofen.  On reexamination, patient reports that she thinks her symptoms flared with the poor air quality.  Certainly this could be the case.  She is not wheezing on exam.  She already has an inhaler and can try this.  Given negative workup as above and normal exam I believe she stable for discharge and close follow-up with her PCP and return if worsening    Disposition   The patient was discharged.     Diagnosis     ICD-10-CM    1. Chest pain, unspecified type  R07.9       2. Nonintractable headache, unspecified chronicity pattern, unspecified headache type  R51.9            Discharge Medications   Discharge Medication List as of 7/13/2025 10:01 PM            Scribe Disclosure:  I, Jenna Araiza, am serving as a scribe at 8:37 PM on 7/13/2025 to document services personally performed by Connie Huizar MD based on my observations and the provider's statements to me.        Connie Huizar MD  07/14/25 [id number]

## 2025-07-15 ENCOUNTER — APPOINTMENT (OUTPATIENT)
Dept: GENERAL RADIOLOGY | Facility: CLINIC | Age: 40
End: 2025-07-15
Attending: STUDENT IN AN ORGANIZED HEALTH CARE EDUCATION/TRAINING PROGRAM
Payer: COMMERCIAL

## 2025-07-15 ENCOUNTER — HOSPITAL ENCOUNTER (EMERGENCY)
Facility: CLINIC | Age: 40
Discharge: HOME | End: 2025-07-15
Attending: STUDENT IN AN ORGANIZED HEALTH CARE EDUCATION/TRAINING PROGRAM
Payer: COMMERCIAL

## 2025-07-15 VITALS
TEMPERATURE: 97.5 F | DIASTOLIC BLOOD PRESSURE: 72 MMHG | HEART RATE: 76 BPM | SYSTOLIC BLOOD PRESSURE: 134 MMHG | OXYGEN SATURATION: 100 % | RESPIRATION RATE: 13 BRPM

## 2025-07-15 DIAGNOSIS — R42 LIGHTHEADEDNESS: ICD-10-CM

## 2025-07-15 DIAGNOSIS — R06.02 SHORTNESS OF BREATH: ICD-10-CM

## 2025-07-15 DIAGNOSIS — R05.1 ACUTE COUGH: ICD-10-CM

## 2025-07-15 DIAGNOSIS — G47.33 SEVERE OBSTRUCTIVE SLEEP APNEA: ICD-10-CM

## 2025-07-15 DIAGNOSIS — E86.0 MILD DEHYDRATION: ICD-10-CM

## 2025-07-15 DIAGNOSIS — E66.01 MORBID OBESITY (H): ICD-10-CM

## 2025-07-15 PROBLEM — E28.2 PCOS (POLYCYSTIC OVARIAN SYNDROME): Status: ACTIVE | Noted: 2018-05-23

## 2025-07-15 LAB
ALBUMIN SERPL BCG-MCNC: 4.3 G/DL (ref 3.5–5.2)
ALP SERPL-CCNC: 78 U/L (ref 40–150)
ALT SERPL W P-5'-P-CCNC: 23 U/L (ref 0–50)
ANION GAP SERPL CALCULATED.3IONS-SCNC: 11 MMOL/L (ref 7–15)
AST SERPL W P-5'-P-CCNC: 24 U/L (ref 0–45)
ATRIAL RATE - MUSE: 80 BPM
BASOPHILS # BLD AUTO: 0 10E3/UL (ref 0–0.2)
BASOPHILS NFR BLD AUTO: 1 %
BILIRUB SERPL-MCNC: 0.4 MG/DL
BUN SERPL-MCNC: 12.2 MG/DL (ref 6–20)
CALCIUM SERPL-MCNC: 9.1 MG/DL (ref 8.8–10.4)
CHLORIDE SERPL-SCNC: 103 MMOL/L (ref 98–107)
CREAT SERPL-MCNC: 1.08 MG/DL (ref 0.51–0.95)
D DIMER PPP FEU-MCNC: 0.34 UG/ML FEU (ref 0–0.5)
DIASTOLIC BLOOD PRESSURE - MUSE: NORMAL MMHG
EGFRCR SERPLBLD CKD-EPI 2021: 67 ML/MIN/1.73M2
EOSINOPHIL # BLD AUTO: 0.2 10E3/UL (ref 0–0.7)
EOSINOPHIL NFR BLD AUTO: 3 %
ERYTHROCYTE [DISTWIDTH] IN BLOOD BY AUTOMATED COUNT: 13.1 % (ref 10–15)
GLUCOSE BLDC GLUCOMTR-MCNC: 98 MG/DL (ref 70–99)
GLUCOSE SERPL-MCNC: 97 MG/DL (ref 70–99)
HCG SERPL QL: NEGATIVE
HCO3 SERPL-SCNC: 25 MMOL/L (ref 22–29)
HCT VFR BLD AUTO: 39.1 % (ref 35–47)
HGB BLD-MCNC: 12.7 G/DL (ref 11.7–15.7)
HOLD SPECIMEN: NORMAL
IMM GRANULOCYTES # BLD: 0 10E3/UL
IMM GRANULOCYTES NFR BLD: 1 %
INTERPRETATION ECG - MUSE: NORMAL
LYMPHOCYTES # BLD AUTO: 1.9 10E3/UL (ref 0.8–5.3)
LYMPHOCYTES NFR BLD AUTO: 33 %
MAGNESIUM SERPL-MCNC: 2.1 MG/DL (ref 1.7–2.3)
MCH RBC QN AUTO: 28.9 PG (ref 26.5–33)
MCHC RBC AUTO-ENTMCNC: 32.5 G/DL (ref 31.5–36.5)
MCV RBC AUTO: 89 FL (ref 78–100)
MONOCYTES # BLD AUTO: 0.4 10E3/UL (ref 0–1.3)
MONOCYTES NFR BLD AUTO: 6 %
NEUTROPHILS # BLD AUTO: 3.4 10E3/UL (ref 1.6–8.3)
NEUTROPHILS NFR BLD AUTO: 58 %
NRBC # BLD AUTO: 0 10E3/UL
NRBC BLD AUTO-RTO: 0 /100
P AXIS - MUSE: 48 DEGREES
PLATELET # BLD AUTO: 304 10E3/UL (ref 150–450)
POTASSIUM SERPL-SCNC: 4 MMOL/L (ref 3.4–5.3)
PR INTERVAL - MUSE: 148 MS
PROT SERPL-MCNC: 7.1 G/DL (ref 6.4–8.3)
QRS DURATION - MUSE: 82 MS
QT - MUSE: 398 MS
QTC - MUSE: 459 MS
R AXIS - MUSE: 23 DEGREES
RBC # BLD AUTO: 4.39 10E6/UL (ref 3.8–5.2)
SODIUM SERPL-SCNC: 139 MMOL/L (ref 135–145)
SYSTOLIC BLOOD PRESSURE - MUSE: NORMAL MMHG
T AXIS - MUSE: 14 DEGREES
TROPONIN T SERPL HS-MCNC: <6 NG/L
VENTRICULAR RATE- MUSE: 80 BPM
WBC # BLD AUTO: 5.9 10E3/UL (ref 4–11)

## 2025-07-15 PROCEDURE — 85004 AUTOMATED DIFF WBC COUNT: CPT | Performed by: STUDENT IN AN ORGANIZED HEALTH CARE EDUCATION/TRAINING PROGRAM

## 2025-07-15 PROCEDURE — 71046 X-RAY EXAM CHEST 2 VIEWS: CPT

## 2025-07-15 PROCEDURE — 96361 HYDRATE IV INFUSION ADD-ON: CPT

## 2025-07-15 PROCEDURE — 99285 EMERGENCY DEPT VISIT HI MDM: CPT | Mod: 25 | Performed by: STUDENT IN AN ORGANIZED HEALTH CARE EDUCATION/TRAINING PROGRAM

## 2025-07-15 PROCEDURE — 85379 FIBRIN DEGRADATION QUANT: CPT | Performed by: STUDENT IN AN ORGANIZED HEALTH CARE EDUCATION/TRAINING PROGRAM

## 2025-07-15 PROCEDURE — 82962 GLUCOSE BLOOD TEST: CPT

## 2025-07-15 PROCEDURE — 84484 ASSAY OF TROPONIN QUANT: CPT | Performed by: STUDENT IN AN ORGANIZED HEALTH CARE EDUCATION/TRAINING PROGRAM

## 2025-07-15 PROCEDURE — 82247 BILIRUBIN TOTAL: CPT | Performed by: STUDENT IN AN ORGANIZED HEALTH CARE EDUCATION/TRAINING PROGRAM

## 2025-07-15 PROCEDURE — 84703 CHORIONIC GONADOTROPIN ASSAY: CPT | Performed by: STUDENT IN AN ORGANIZED HEALTH CARE EDUCATION/TRAINING PROGRAM

## 2025-07-15 PROCEDURE — 36415 COLL VENOUS BLD VENIPUNCTURE: CPT | Performed by: STUDENT IN AN ORGANIZED HEALTH CARE EDUCATION/TRAINING PROGRAM

## 2025-07-15 PROCEDURE — 83735 ASSAY OF MAGNESIUM: CPT | Performed by: STUDENT IN AN ORGANIZED HEALTH CARE EDUCATION/TRAINING PROGRAM

## 2025-07-15 PROCEDURE — 258N000003 HC RX IP 258 OP 636: Performed by: STUDENT IN AN ORGANIZED HEALTH CARE EDUCATION/TRAINING PROGRAM

## 2025-07-15 PROCEDURE — 96360 HYDRATION IV INFUSION INIT: CPT

## 2025-07-15 PROCEDURE — 93005 ELECTROCARDIOGRAM TRACING: CPT

## 2025-07-15 RX ADMIN — SODIUM CHLORIDE 1000 ML: 0.9 INJECTION, SOLUTION INTRAVENOUS at 07:37

## 2025-07-15 ASSESSMENT — ACTIVITIES OF DAILY LIVING (ADL)
ADLS_ACUITY_SCORE: 46

## 2025-07-15 NOTE — ED PROVIDER NOTES
Emergency Department Note      History of Present Illness     Chief Complaint   Lightheadedness    HPI   Melody Ramos is a pleasant 39 year old female with a history of hypertension, elevated BMI, anxiety, presenting with generalized weakness, fatigue, and lightheadedness. Patient reports she had difficulty getting out of bed this morning due to weakness in all four extremities, shakiness, lightheadedness, dizziness, fatigue. After getting out of bed she felt like she was going to pass out while taking a shower and had black spots in her vision. Her  then helped her back to the bed. Symptoms did not resolve so she came to the ED. Endorses nausea earlier this morning but not currently. She is currently unsteady while walking due to weakness.  Lightheadedness is exacerbated with exertion. Patient has had sinus congestion, cough, and head pressure since last Wednesday. Two days ago (Sunday) she begin to experience shortness of breath and visited the ED (details below). Since then she has felt relatively okay and went to work yesterday, but notes she still had mild chest pain and cough. Patient slept normal all night. She uses a CPAP while sleeping at baseline. No changes to speech. Denies fever, abdominal pain, or vomiting. No leg swelling or pain. No history of blood clots. Patient was eating normally yesterday. She notes that for the last 5-6 days the tattoos to her shoulder and leg (which she has had for a long period of time) have felt raised and itchy. Denies chance of pregnancy. Patient just started fluoxetine 3 days ago which she has used in the past without complication. History of pneumonia in December.     Independent Historian   None    Review of External Notes   I personally reviewed notes from the patient's clinic visit dated 12/10/24. This provided me with information regarding patient's baseline medical problems.     Past Medical History     Medical History and Problem List    Anxiety  Hypertension  Sleep apnea  Obesity  PCOS  IBS  Hypertension    Medications   Albuterol  Tessalon  Decadron  Atarax  Xopenex    Surgical History       Physical Exam     Patient Vitals for the past 24 hrs:   BP Temp Temp src Pulse Resp SpO2   07/15/25 0941 -- -- -- 76 -- 100 %   07/15/25 0940 134/72 -- -- 75 -- --   07/15/25 0845 (!) 150/81 -- -- 71 13 99 %   07/15/25 0815 (!) 153/81 -- -- 80 12 100 %   07/15/25 0745 124/62 -- -- 67 -- 98 %   07/15/25 0740 -- -- -- 68 13 98 %   07/15/25 0730 129/68 -- -- 73 18 99 %   07/15/25 0725 -- -- -- 80 13 99 %   07/15/25 0700 132/73 -- -- 83 -- 98 %   07/15/25 0640 (!) 163/67 97.5  F (36.4  C) Temporal 99 18 100 %     Physical Exam  Gen:  Ill-appearing, appears stated age, elevated BMI,  HEENT:  Moist mucous membranes. No scleral icterus.  Neck:   Supple  CV:  Regular rate and rhythm, S1, S2, no murmurs, gallops or rubs. No chest wall tenderness to palpation.   2+ radial pulses equal bilaterally  Pulm:  Easy work of breathing, lungs clear to auscultation bilaterally, no rales, rhonchi, wheeze  Abd:  Soft, non-tender, no guarding or rebound.   Non-distended.  No pulsatile mass  MSK:  No edema in bilateral lower extremities. No tenderness or swelling in bilateral lower extremities.  Skin:  Warm and dry, no pallor. No jaundice.   Psych:  Cooperative, appropriate affect  Neuro:  Mental status  Alertness: Alert  Orientation: Oriented to self, place, time, and current events  Language: normal naming, normal fluency, and no dysarthria  Cranial nerves  CN II: acuity grossly intact, visual fields intact, and direct pupillary light response normal  CN III/IV/VI: EOMI and consensual pupillary light reflex normal  CN V: facial sensation intact to light touch throughout  CN VII: face symmetric  CN VIII: finger rub normal  CN IX/X: palate & uvula symmetric  CN XI: equal shoulder shrug  CN XII: tongue midline  Motor  no drift and normal strength in all four  extremities  Sensory  intact sensation to light touch distally in all 4 extremities and face  Coordination  Gait is normal  finger-nose-finger normal, heel-shin normal, and rapid alternating movements normal  Negative Romberg    Diagnostics     Lab Results   Labs Ordered and Resulted from Time of ED Arrival to Time of ED Departure   COMPREHENSIVE METABOLIC PANEL - Abnormal       Result Value    Sodium 139      Potassium 4.0      Carbon Dioxide (CO2) 25      Anion Gap 11      Urea Nitrogen 12.2      Creatinine 1.08 (*)     GFR Estimate 67      Calcium 9.1      Chloride 103      Glucose 97      Alkaline Phosphatase 78      AST 24      ALT 23      Protein Total 7.1      Albumin 4.3      Bilirubin Total 0.4     GLUCOSE BY METER - Normal    GLUCOSE BY METER POCT 98     D DIMER QUANTITATIVE - Normal    D-Dimer Quantitative 0.34     TROPONIN T, HIGH SENSITIVITY - Normal    Troponin T, High Sensitivity <6     MAGNESIUM - Normal    Magnesium 2.1     HCG QUALITATIVE PREGNANCY - Normal    hCG Serum Qualitative Negative     CBC WITH PLATELETS AND DIFFERENTIAL    WBC Count 5.9      RBC Count 4.39      Hemoglobin 12.7      Hematocrit 39.1      MCV 89      MCH 28.9      MCHC 32.5      RDW 13.1      Platelet Count 304      % Neutrophils 58      % Lymphocytes 33      % Monocytes 6      % Eosinophils 3      % Basophils 1      % Immature Granulocytes 1      NRBCs per 100 WBC 0      Absolute Neutrophils 3.4      Absolute Lymphocytes 1.9      Absolute Monocytes 0.4      Absolute Eosinophils 0.2      Absolute Basophils 0.0      Absolute Immature Granulocytes 0.0      Absolute NRBCs 0.0         Imaging   Chest XR,  PA & LAT   Final Result   IMPRESSION: No acute cardiopulmonary process.          EKG   ECG taken at 0705, ECG read at 0715  Normal sinus rhythm with sinus arrhythmia   No significant change as compared to prior, dated 7/13/2025.  Rate 80 bpm. CA interval 148 ms. QRS duration 82 ms. QT/QTc 398/459 ms. P-R-T axes 48 23 14.      Independent Interpretation   CXR: No infiltrate.    ED Course      Medications Administered   Medications   sodium chloride 0.9% BOLUS 1,000 mL (0 mLs Intravenous Stopped 7/15/25 0935)       Procedures   Procedures   None performed    Discussion of Management   None    ED Course   ED Course as of 07/15/25 1252   Tue Jul 15, 2025   0640 I obtained history and examined the patient as noted above.     0730 I rechecked and updated the patient. Obtained additional history.      0930 I rechecked and updated the patient.         Additional Documentation  None    Medical Decision Making / Diagnosis     CMS Diagnoses: None    [facility]   None               [facility]   Melody Michelle Ramos is a 39 year old female presenting with a variety of symptoms including lightheadedness, recent cough, shortness of breath, generalized weakness, among others.  On initial presentation, patient's heart rate is mildly tachycardic and she is hypertensive.  Considered differential including pulmonary embolism, developing pneumonia, electrolyte abnormality, anemia, cardiac arrhythmia, viral illness, postviral syndrome, medication side effect, among others.  I did conduct a complete neurologic exam after being called to the room for the patient's dizziness/lightheadedness. Complete neurologic exam was reassuring, with no focal deficits, patient able to ambulate with steady gait, negative Romberg.  I do not suspect posterior stroke given completely reassuring exam.  History seems more consistent with lightheadedness and generalized weakness.  Workup appears reassuring.  Chest x-ray shows no infiltrate or pneumonia.  Due to patient's tachycardia, I was unable to use PERC rule to rule out pulmonary embolism.  I do think she is low risk.  I did obtain a D-dimer, which was within normal limits, reassuring against PE.  Electrolytes within normal limits, there is no anemia, no evidence of cardiac arrhythmia on EKG.  The only abnormality that we noted was mildly  reduced kidney function, which may reflect some dehydration.  I did treat the patient with fluids.  She experienced some mild improvement in her symptoms but they did not fully resolve.  Given recent cough, question whether she may be having symptoms of viral illness.  Also question whether restarting fluoxetine 4 days ago could be contributing, especially to her sensation of dizziness.  Given the coincidence of starting this medication and presenting with the symptoms, I did recommend she hold this medicine until she is able to follow-up with her primary care clinician.  She does not currently have a primary care clinician so I did place a referral for her.  Additional instructions were provided.  Return precautions were provided.    Disposition   The patient was discharged.     Diagnosis     ICD-10-CM    1. Mild dehydration  E86.0       2. Lightheadedness  R42       3. Acute cough  R05.1       4. Shortness of breath  R06.02       5. Severe obstructive sleep apnea  G47.33 ED To Primary Care Referral      6. Morbid obesity (H)  E66.01 ED To Primary Care Referral           Discharge Medications   Discharge Medication List as of 7/15/2025  9:37 AM            Scribe Disclosure:  IJosé, am serving as a scribe at 6:48 AM on 7/15/2025 to document services personally performed by José Bennett MD based on my observations and the provider's statements to me.       José Bennett MD  07/15/25 [id number]

## 2025-07-15 NOTE — ED TRIAGE NOTES
Pt here for dizziness that started at about 0555. Denies any vision changes, however endorses weakness in the extremities. Recently see for SOB and CP on Sunday.

## 2025-07-15 NOTE — DISCHARGE INSTRUCTIONS
Thank you for allowing us to evaluate you today.  Follow up with primary care clinician  in 1 week for reevaluation.. I have placed a referral for you.   Drink plenty of fluids.  Hold the Prozac until you see a regular doctor and monitor if your symptoms change  Please read the guidance provided with your discharge instructions.  Immediately return to the emergency department with any concerns.

## 2025-07-18 ENCOUNTER — HOSPITAL ENCOUNTER (OUTPATIENT)
Dept: CT IMAGING | Facility: CLINIC | Age: 40
Discharge: HOME | End: 2025-07-18
Attending: NURSE PRACTITIONER | Admitting: NURSE PRACTITIONER
Payer: COMMERCIAL

## 2025-07-18 DIAGNOSIS — R06.02 SOB (SHORTNESS OF BREATH): ICD-10-CM

## 2025-07-18 PROCEDURE — 71250 CT THORAX DX C-: CPT

## 2025-07-22 ENCOUNTER — OFFICE VISIT (OUTPATIENT)
Dept: FAMILY MEDICINE | Facility: CLINIC | Age: 40
End: 2025-07-22
Payer: COMMERCIAL

## 2025-07-22 VITALS
OXYGEN SATURATION: 100 % | HEIGHT: 66 IN | DIASTOLIC BLOOD PRESSURE: 74 MMHG | SYSTOLIC BLOOD PRESSURE: 124 MMHG | TEMPERATURE: 98.3 F | HEART RATE: 70 BPM | WEIGHT: 293 LBS | RESPIRATION RATE: 16 BRPM | BODY MASS INDEX: 47.09 KG/M2

## 2025-07-22 DIAGNOSIS — R11.0 NAUSEA: ICD-10-CM

## 2025-07-22 DIAGNOSIS — R31.29 MICROSCOPIC HEMATURIA: Primary | ICD-10-CM

## 2025-07-22 LAB
ALBUMIN UR-MCNC: NEGATIVE MG/DL
APPEARANCE UR: CLEAR
BILIRUB UR QL STRIP: NEGATIVE
COLOR UR AUTO: YELLOW
GLUCOSE UR STRIP-MCNC: NEGATIVE MG/DL
HGB UR QL STRIP: NEGATIVE
KETONES UR STRIP-MCNC: NEGATIVE MG/DL
LEUKOCYTE ESTERASE UR QL STRIP: NEGATIVE
NITRATE UR QL: NEGATIVE
PH UR STRIP: 6 [PH] (ref 5–7)
SP GR UR STRIP: 1.01 (ref 1–1.03)
UROBILINOGEN UR STRIP-ACNC: 0.2 E.U./DL

## 2025-07-22 PROCEDURE — 1126F AMNT PAIN NOTED NONE PRSNT: CPT | Performed by: FAMILY MEDICINE

## 2025-07-22 PROCEDURE — 87086 URINE CULTURE/COLONY COUNT: CPT | Performed by: FAMILY MEDICINE

## 2025-07-22 PROCEDURE — 3078F DIAST BP <80 MM HG: CPT | Performed by: FAMILY MEDICINE

## 2025-07-22 PROCEDURE — 3074F SYST BP LT 130 MM HG: CPT | Performed by: FAMILY MEDICINE

## 2025-07-22 PROCEDURE — G2211 COMPLEX E/M VISIT ADD ON: HCPCS | Performed by: FAMILY MEDICINE

## 2025-07-22 PROCEDURE — 81003 URINALYSIS AUTO W/O SCOPE: CPT | Performed by: FAMILY MEDICINE

## 2025-07-22 PROCEDURE — 99213 OFFICE O/P EST LOW 20 MIN: CPT | Performed by: FAMILY MEDICINE

## 2025-07-22 RX ORDER — OMEPRAZOLE 20 MG/1
20 CAPSULE, DELAYED RELEASE ORAL DAILY
Qty: 30 CAPSULE | Refills: 0 | Status: SHIPPED | OUTPATIENT
Start: 2025-07-22

## 2025-07-22 RX ORDER — ONDANSETRON 4 MG/1
4 TABLET, ORALLY DISINTEGRATING ORAL EVERY 8 HOURS PRN
Qty: 30 TABLET | Refills: 0 | Status: SHIPPED | OUTPATIENT
Start: 2025-07-22

## 2025-07-22 RX ORDER — OMEPRAZOLE 20 MG/1
20 CAPSULE, DELAYED RELEASE ORAL DAILY
Qty: 90 CAPSULE | OUTPATIENT
Start: 2025-07-22

## 2025-07-22 ASSESSMENT — PAIN SCALES - GENERAL: PAINLEVEL_OUTOF10: NO PAIN (0)

## 2025-07-23 LAB — BACTERIA UR CULT: NORMAL

## 2025-07-29 ENCOUNTER — OFFICE VISIT (OUTPATIENT)
Dept: FAMILY MEDICINE | Facility: CLINIC | Age: 40
End: 2025-07-29
Payer: COMMERCIAL

## 2025-07-29 ENCOUNTER — ANCILLARY PROCEDURE (OUTPATIENT)
Dept: GENERAL RADIOLOGY | Facility: CLINIC | Age: 40
End: 2025-07-29
Attending: NURSE PRACTITIONER
Payer: COMMERCIAL

## 2025-07-29 ENCOUNTER — E-CONSULT (OUTPATIENT)
Dept: GASTROENTEROLOGY | Facility: CLINIC | Age: 40
End: 2025-07-29
Payer: COMMERCIAL

## 2025-07-29 ENCOUNTER — MYC MEDICAL ADVICE (OUTPATIENT)
Dept: FAMILY MEDICINE | Facility: CLINIC | Age: 40
End: 2025-07-29

## 2025-07-29 VITALS
BODY MASS INDEX: 47.09 KG/M2 | DIASTOLIC BLOOD PRESSURE: 85 MMHG | OXYGEN SATURATION: 98 % | HEART RATE: 82 BPM | WEIGHT: 293 LBS | TEMPERATURE: 98.9 F | RESPIRATION RATE: 20 BRPM | SYSTOLIC BLOOD PRESSURE: 143 MMHG | HEIGHT: 66 IN

## 2025-07-29 DIAGNOSIS — R11.0 NAUSEA: ICD-10-CM

## 2025-07-29 DIAGNOSIS — K21.00 GASTROESOPHAGEAL REFLUX DISEASE WITH ESOPHAGITIS WITHOUT HEMORRHAGE: ICD-10-CM

## 2025-07-29 DIAGNOSIS — K21.00 GASTROESOPHAGEAL REFLUX DISEASE WITH ESOPHAGITIS WITHOUT HEMORRHAGE: Primary | ICD-10-CM

## 2025-07-29 DIAGNOSIS — R07.89 DISCOMFORT IN CHEST: ICD-10-CM

## 2025-07-29 DIAGNOSIS — R68.84 JAW PAIN, NON-TMJ: ICD-10-CM

## 2025-07-29 PROCEDURE — 99214 OFFICE O/P EST MOD 30 MIN: CPT | Performed by: NURSE PRACTITIONER

## 2025-07-29 PROCEDURE — 3077F SYST BP >= 140 MM HG: CPT | Performed by: NURSE PRACTITIONER

## 2025-07-29 PROCEDURE — 1126F AMNT PAIN NOTED NONE PRSNT: CPT | Performed by: NURSE PRACTITIONER

## 2025-07-29 PROCEDURE — 74019 RADEX ABDOMEN 2 VIEWS: CPT | Mod: TC | Performed by: RADIOLOGY

## 2025-07-29 PROCEDURE — 99451 NTRPROF PH1/NTRNET/EHR 5/>: CPT | Performed by: INTERNAL MEDICINE

## 2025-07-29 PROCEDURE — 3079F DIAST BP 80-89 MM HG: CPT | Performed by: NURSE PRACTITIONER

## 2025-07-29 RX ORDER — SUCRALFATE 1 G/1
1 TABLET ORAL 4 TIMES DAILY
Qty: 40 TABLET | Refills: 0 | Status: SHIPPED | OUTPATIENT
Start: 2025-07-29 | End: 2025-08-08

## 2025-07-29 ASSESSMENT — PAIN SCALES - GENERAL: PAINLEVEL_OUTOF10: NO PAIN (0)

## 2025-07-29 NOTE — PROGRESS NOTES
Assessment & Plan     Gastroesophageal reflux disease with esophagitis without hemorrhage  - XR Abdomen 2 Views  - Helicobacter pylori Antigen Stool  - Helicobacter pylori Antigen Stool  - Adult E-Consult to Gastroenterology (Outpt Provider to Specialist Written Question & Response)    Nausea  - Adult E-Consult to Gastroenterology (Outpt Provider to Specialist Written Question & Response)    Discomfort in chest  - Adult E-Consult to Gastroenterology (Outpt Provider to Specialist Written Question & Response)    Jaw pain, non-TMJ  No exertional symptoms. No TMJ pain. Troponin negative with same symptoms in ED 7/15/25.     MED REC REQUIRED  Post Medication Reconciliation Status: discharge medications reconciled and changed, per note/orders    I am going to send the Carafate to take prior to meals.     The second thing we could do is increase the omeprazole to twice a day 20 mg am and 20 mg pm. Start with the Carafate first for a couple days and if still not that much improvement we can try upping the omeprazole.     Xray today appears ok. Await radiology    Await h pylori test.     Zeferino Oliver is a 39 year old, presenting for the following health issues:  Follow Up        7/29/2025    11:01 AM   Additional Questions   Roomed by ANYA Lopez   Accompanied by Self         7/29/2025    11:01 AM   Patient Reported Additional Medications   Patient reports taking the following new medications No     HPI      She does feel like it is gastro related. Had felt a little better yesterday so ate 3 meals and much worse today.    Drinking well but eating is difficult. Some left upper quadrant pain on and off. Pain is esophageal and feels very uncomfortable after meals. Doesn't matter what food she eats it occurs with anything. Nausea every morning. A bit better since the omeprazole but today is not.     No BM for 4 days but low intake, previously constipated. No cramping or epigastric pain. Zofran use in the morning. Most  "days.   She gets some relief from burping.         Constitutional, HEENT, cardiovascular, pulmonary, GI, , musculoskeletal, neuro, skin, endocrine and psych systems are negative, except as otherwise noted.  '      Objective    Pulse 78   Temp 98.9  F (37.2  C)   Resp 20   Ht 1.676 m (5' 6\")   Wt (!) 144.2 kg (318 lb)   LMP 07/06/2025 (Approximate)   SpO2 98%   Breastfeeding No   BMI 51.33 kg/m    Body mass index is 51.33 kg/m .  Physical Exam   GENERAL: alert and no distress  NECK: no adenopathy, no asymmetry, masses, or scars  RESP: lungs clear to auscultation - no rales, rhonchi or wheezes  CV: regular rate and rhythm, normal S1 S2, no S3 or S4, no murmur, click or rub, no peripheral edema  ABDOMEN: soft, nontender, no hepatosplenomegaly, no masses and bowel sounds normal  MS: no gross musculoskeletal defects noted, no edema            Signed Electronically by: Eloise Willard CNP    "

## 2025-07-29 NOTE — PROGRESS NOTES
7/29/2025     E-Consult has been accepted.    Interprofessional consultation requested by:  Eloise Willard CNP      Clinical Question/Purpose: MY CLINICAL QUESTION IS: patient with ongoing nausea, chest/epigastric discomfort, burping, exacerabted by eating since ED visit on 7/15/25. Initially thought to be a viral syndrome but symptoms are ongoing. Started omeprazole and prn zofran on 7/22. Mild improvement. Avoiding eating as any food aggravates, causing epigastric/esophageal discomfort after eating. Bowels are a bit slow but not looking too constipated on xray done today, just less intake.     Testing h pylori, added carafate, consider increase omeprazole today. Any other testing or adjustments you would advise given her ongoing symtpoms?     Patient assessment and information reviewed:   Nasuea, GERD?, chest discomfort, burping  Some improvement with omeprazole daily  History of constipation per chart (no BM for 4 days noted during office visit)  Possible viral infection 2 weeks ago with start of these symptoms.  LFTs, BMP, CBC and AXR unremarkable.    It is possible that her symptoms were part of a viral syndrome. This can take several weeks to resolve. It will be reasonable increase PPI to BID and can even add carafate slurry as you are (agree with this plan).     Constipation could certainly be making things worse. Zofran is well known to cause constipation and if she is taking regularly this could be exacerbating things. Recommend trial of miralax (start daily and can gradually increase to three times a day if needed). Goal is 1-2 soft BMs per day.    If no improvement in symptoms in the next 2-4 weeks or if she develops red flag symptoms (dysphagia, odynophagia, weight loss, melena) then would proceed with an EGD.    Recommendations:   -see above discussion  -reasonable to trial conservative measures with omeprazole 40 mg PO BID and carafate slurry 2-4 times per day  -trial miralax as above with  goal 1-2 soft BMs per day (especially if using zofran regularly)  -agree with testing h pylori  -if no improvement in 2-4 weeks or if symptoms worsen then get EGD +/- CT abd/pelvis       The recommendations provided in this E-Consult are based on a review of clinical data pertinent to the clinical question presented, without a review of the patient's complete medical record or, the benefit of a comprehensive in-person or virtual patient evaluation. This consultation should not replace the clinical judgement and evaluation of the provider ordering this E-Consult. Any new clinical issues, or changes in patient status since the filing of this E-Consult will need to be taken into account when assessing these recommendations. Please contact me if you have further questions.    My total time spent reviewing clinical information and formulating assessment was 20 minutes.        Lai Velasco MD

## 2025-07-30 ENCOUNTER — PATIENT OUTREACH (OUTPATIENT)
Dept: CARE COORDINATION | Facility: CLINIC | Age: 40
End: 2025-07-30

## 2025-07-30 PROCEDURE — 87338 HPYLORI STOOL AG IA: CPT | Performed by: NURSE PRACTITIONER

## 2025-07-31 ENCOUNTER — NURSE TRIAGE (OUTPATIENT)
Dept: FAMILY MEDICINE | Facility: CLINIC | Age: 40
End: 2025-07-31

## 2025-07-31 ENCOUNTER — OFFICE VISIT (OUTPATIENT)
Dept: PEDIATRICS | Facility: CLINIC | Age: 40
End: 2025-07-31
Payer: COMMERCIAL

## 2025-07-31 VITALS
BODY MASS INDEX: 51.33 KG/M2 | HEART RATE: 74 BPM | RESPIRATION RATE: 18 BRPM | WEIGHT: 293 LBS | DIASTOLIC BLOOD PRESSURE: 96 MMHG | OXYGEN SATURATION: 100 % | SYSTOLIC BLOOD PRESSURE: 151 MMHG

## 2025-07-31 DIAGNOSIS — R11.2 NAUSEA AND VOMITING, UNSPECIFIED VOMITING TYPE: ICD-10-CM

## 2025-07-31 DIAGNOSIS — R10.84 ABDOMINAL PAIN, GENERALIZED: Primary | ICD-10-CM

## 2025-07-31 LAB
ALBUMIN SERPL BCG-MCNC: 4.4 G/DL (ref 3.5–5.2)
ALP SERPL-CCNC: 74 U/L (ref 40–150)
ALT SERPL W P-5'-P-CCNC: 33 U/L (ref 0–50)
ANION GAP SERPL CALCULATED.3IONS-SCNC: 13 MMOL/L (ref 7–15)
AST SERPL W P-5'-P-CCNC: 21 U/L (ref 0–45)
BASOPHILS # BLD AUTO: 0 10E3/UL (ref 0–0.2)
BASOPHILS NFR BLD AUTO: 1 %
BILIRUB SERPL-MCNC: 0.6 MG/DL
BUN SERPL-MCNC: 6.5 MG/DL (ref 6–20)
CALCIUM SERPL-MCNC: 9.4 MG/DL (ref 8.8–10.4)
CHLORIDE SERPL-SCNC: 100 MMOL/L (ref 98–107)
CREAT SERPL-MCNC: 1 MG/DL (ref 0.51–0.95)
CRP SERPL-MCNC: 6.6 MG/L
EGFRCR SERPLBLD CKD-EPI 2021: 74 ML/MIN/1.73M2
EOSINOPHIL # BLD AUTO: 0.1 10E3/UL (ref 0–0.7)
EOSINOPHIL NFR BLD AUTO: 1 %
ERYTHROCYTE [DISTWIDTH] IN BLOOD BY AUTOMATED COUNT: 13.2 % (ref 10–15)
GLUCOSE SERPL-MCNC: 85 MG/DL (ref 70–99)
H PYLORI AG STL QL IA: NEGATIVE
HCG UR QL: NEGATIVE
HCO3 SERPL-SCNC: 24 MMOL/L (ref 22–29)
HCT VFR BLD AUTO: 40.4 % (ref 35–47)
HGB BLD-MCNC: 13.3 G/DL (ref 11.7–15.7)
IMM GRANULOCYTES # BLD: 0 10E3/UL
IMM GRANULOCYTES NFR BLD: 0 %
LIPASE SERPL-CCNC: 39 U/L (ref 13–60)
LYMPHOCYTES # BLD AUTO: 1.7 10E3/UL (ref 0.8–5.3)
LYMPHOCYTES NFR BLD AUTO: 24 %
MCH RBC QN AUTO: 28.9 PG (ref 26.5–33)
MCHC RBC AUTO-ENTMCNC: 32.9 G/DL (ref 31.5–36.5)
MCV RBC AUTO: 88 FL (ref 78–100)
MONOCYTES # BLD AUTO: 0.5 10E3/UL (ref 0–1.3)
MONOCYTES NFR BLD AUTO: 7 %
NEUTROPHILS # BLD AUTO: 4.9 10E3/UL (ref 1.6–8.3)
NEUTROPHILS NFR BLD AUTO: 68 %
NRBC # BLD AUTO: 0 10E3/UL
NRBC BLD AUTO-RTO: 0 /100
PLATELET # BLD AUTO: 286 10E3/UL (ref 150–450)
POTASSIUM SERPL-SCNC: 3.8 MMOL/L (ref 3.4–5.3)
PROT SERPL-MCNC: 7.3 G/DL (ref 6.4–8.3)
RBC # BLD AUTO: 4.6 10E6/UL (ref 3.8–5.2)
SODIUM SERPL-SCNC: 137 MMOL/L (ref 135–145)
WBC # BLD AUTO: 7.2 10E3/UL (ref 4–11)

## 2025-07-31 RX ORDER — OMEPRAZOLE 20 MG/1
20 CAPSULE, DELAYED RELEASE ORAL 2 TIMES DAILY
Qty: 180 CAPSULE | Refills: 1 | Status: SHIPPED | OUTPATIENT
Start: 2025-07-31

## 2025-07-31 NOTE — TELEPHONE ENCOUNTER
Provider Response to 2nd Level Triage Request    I have reviewed the RN documentation. My recommendation is:  Refer to Acute and Diagnostic Services (ADS) clinic.     Referral to Acute and Diagnostic Services          Patient qualifies for First Order Acute services at the ADS clinic.    Patient diagnoses/treatments needed:  likely labs and CT scan, abd pain, GERD sx worsening with vomiting today  ADS Referral placed: Yes    Nursing staff to contact patient and confirm willingness to receive next level of care at the [facility] site in [place] ([phone], internal use only), [place] ([phone], internal use only), [place] ([phone], internal use only), [place] ([phone], internal use only) or [place] ([phone], internal use only) . Confirm the following are true:    Patient has transportation to travel to [facility] without delay  Patient understands that evaluation/treatment at [facility] typically takes significantly longer than in clinic/urgent care (>2 hours)    If patient is in agreement, contact the ADS to confirm patient acceptance and provide necessary information to ADS provider.       For patients going to the [place] [facility]S, instruct patients to enter the east entrance of the building ([address]) and go to Check In C    For patients going to the [place] [facility]S, have them enter building ([address] attached to [facility]) and go to the 2nd floor, [address]    For patients going to the [place] [facility]S, park on surface lot, use west center entrance door ([address], [facility]), clinic is on your left as you enter the building, [address].    For patients going to the [place] [facility]S, instruct patient to enter through the main entrance and follow the signage to [address], the ADS shares space with the Primary Care and Walk In Clinic    For patients going to the [place] [facility]S, instruct patient to enter through the main entrance and arrive for appointment  at [facility]

## 2025-07-31 NOTE — TELEPHONE ENCOUNTER
"S-(situation): nausea and vomiting (green), left upper quadrant abdominal pain.  Today she reports feeling \"foggy, out of it\". No visible blood in vomitus, denies fever.  States she is drinking fluids and urinating, just doesn't want to eat. Denies abdominal swelling or bloating.    B-(background): Has felt poorly x2 weeks, was in ED twice and seen in clinic for symptoms. Has felt constipated, had 2 hard stools 7/30/25 and 1 soft stool today.      A-(assessment): Feels about the same as she has felt for the past 2 weeks except now vomiting.  Green emesis x2 onset 6:00 a.m. today.    R-(recommendations): protocol indicates patient should be seen in clinic.  Did schedule her to be seen by Eloise Willard this morning in clinic.   Reason for Disposition   Vomiting contains bile (green color)    Additional Information   Negative: Shock suspected (e.g., cold/pale/clammy skin, too weak to stand, low BP, rapid pulse)   Negative: Difficult to awaken or acting confused (e.g., disoriented, slurred speech)   Negative: Sounds like a life-threatening emergency to the triager   Negative: Vomiting occurs only while coughing   Negative: Pregnant < 20 Weeks and nausea/vomiting began in early pregnancy (i.e., 4-8 weeks pregnant)   Negative: Chest pain   Negative: Headache is main symptom   Negative: Vomiting red blood or black (coffee ground) material   Negative: Vomiting and hernia is more painful or swollen than usual   Negative: Recent head injury (within 3 days)   Negative: Recent abdominal injury (within 7 days)   Negative: Insulin-dependent diabetes and glucose > 240 mg/dL (13 mmol/L)   Negative: Severe pain in one eye   Negative: SEVERE vomiting (e.g., 6 or more times/day)  (Exception: Patient sounds well, is drinking liquids, does not sound dehydrated, and vomiting has lasted less than 24 hours.)   Negative: MODERATE vomiting (e.g., 3 - 5 times/day) and age > 60 years   Negative: Constant abdominal pain lasting > 2 hours   Negative: " "High-risk adult (e.g., brain tumor, V-P shunt, hernia)   Negative: Drinking very little and has signs of dehydration (e.g., no urine > 12 hours, very dry mouth, very lightheaded)   Negative: Patient sounds very sick or weak to the triager   Negative: Vomiting and abdomen looks much more swollen than usual    Answer Assessment - Initial Assessment Questions  1. VOMITING SEVERITY: \"How many times have you vomited in the past 24 hours?\"       Emesis x2 today  2. ONSET: \"When did the vomiting begin?\"       6:00 a.m. today  3. FLUIDS: \"What fluids or food have you vomited up today?\" \"Have you been able to keep any fluids down?\"      Has not had any fluids  4. ABDOMEN PAIN: \"Are your having any abdomen pain?\" If Yes : \"How bad is it and what does it feel like?\" (e.g., crampy, dull, intermittent, constant)       Left upper quadrant abdominal pain on and off  5. DIARRHEA: \"Is there any diarrhea?\" If Yes, ask: \"How many times today?\"       none  6. CONTACTS: \"Is there anyone else in the family with the same symptoms?\"       no  7. CAUSE: \"What do you think is causing your vomiting?\"      GI issue  8. HYDRATION STATUS: \"Any signs of dehydration?\" (e.g., dry mouth [not only dry lips], too weak to stand) \"When did you last urinate?\"      Denies feeling dehydrated urinating well  9. OTHER SYMPTOMS: \"Do you have any other symptoms?\" (e.g., fever, headache, vertigo, vomiting blood or coffee grounds, recent head injury)      Has vomiting today and has had a constant headache x2 weeks, back of jaw hurts bilaterally  10. PREGNANCY: \"Is there any chance you are pregnant?\" \"When was your last menstrual period?\"        No.  7/5/25    Protocols used: Vomiting-A-OH    "

## 2025-07-31 NOTE — PATIENT INSTRUCTIONS
Continue the omeprazole once daily      Schedule appointment to meet with gastroenterology to have the EGD procedure done.   If wait times are too far out you can check with your insurance -- one other big group in the area is [place] [facility] [phone]      If symptoms worsen please seek help right away

## 2025-07-31 NOTE — TELEPHONE ENCOUNTER
ADS - noon today - per ADS provider Dr. Masters       Called #   Telephone Information:   Mobile [phone]       Advised pt  on the information above     Patient stated an understanding and agreed with plan.      Savannah Seo RN, BSN  [facility]

## 2025-07-31 NOTE — PROGRESS NOTES
Sharp Pain LUQ intermittent  Pain upper abdomen.     Started omeprazole 9 days ago, no relief.     Eating triggers symptoms.     NO hx of pancreatitis    Pain rates as moderate      Absent of fever/diarrhea    No recent travel

## 2025-07-31 NOTE — TELEPHONE ENCOUNTER
Pcp advised ADS    Called # [phone] and spoke with patient     Asap for appt. In ADS     Agreeable to ADS    Alfreda Velázquez RN on 7/31/2025 at 8:02 AM   [facility] Arun Hendrickson      Called ADS and messaged TC at ADS, awaiting call back    Stick with Jess per patient,     Awaiting to hear back from ADS provider on acceptance       Referral to Acute and Diagnostic Services    [phone] ([place]) [place] [address] [facility], [address], [place] [address]    Transition to Acute & Diagnostic Services Clinic has been discussed with patient, and she agrees with next level of care.   Patient understands that evaluation/treatment at ADS typically takes significantly longer than in clinic/urgent care (>2 hours).  The [facility] Acute and Diagnostics Services Clinic has been contacted by provider/staff to confirm patient acceptance.         Special issues:      None

## 2025-07-31 NOTE — PROGRESS NOTES
Assessment & Plan     Abdominal pain, generalized  - sodium chloride (PF) 0.9% PF flush 3 mL  - CRP inflammation  - Comprehensive metabolic panel  - CBC with platelets differential  - Lipase  - CT Abdomen Pelvis w Contrast  - HCG Qual, Urine ([id number])  - CRP inflammation  - Comprehensive metabolic panel  - CBC with platelets differential  - Lipase  - HCG Qual, Urine ([id number])    Nausea and vomiting, unspecified vomiting type  - Adult GI  Referral - Procedure Only     CT imaging and blood work unremarkable will advise patient to meet with gastroenterology for further assessment may need an EGD procedure to further investigate her symptoms  Fortunately no intraabdominal catastrophe was identified today as discussed with patient.     Does not appear dehydrated at this time    Opts to proceed with zofran for as needed nausea symptoms, reports she has adequate supply at home.     Set expectations on wait times for Endoscopy/ GI referral as availabilities could be several months out    Continue omeprazole as prescribed    45 minutes spent on the date of the encounter doing chart review, history and exam, provider to provider consultation, lab/imaging review, developing treatment plan, patient education, documentation, and further activities per the note.             Bryson Martinez MD   [place] [facility]    Zeferino Oliver is a 39 year old female who is referred to the Acute Diagnostic services from a nurse triage call    Chief Complaint   Patient presents with    Nausea & Vomiting     X 2 weeks, intermittent vomiting, with nausea in the AM.  Zofran helps some.     HPI    Sharp Pain LUQ intermittent  Pain upper abdomen.      Started omeprazole 9 days ago, no relief.      Eating triggers symptoms.      NO hx of pancreatitis     Pain rates as moderate        Absent of fever/diarrhea     No recent travel     Of note patient previously seen in the emergency room initially on July 13 and July 15  1  episode was for chest pressure that was worked up and found to be negative on both blood work and chest imaging  She had subsequent follow-up for various symptoms with her primary care about 4 times ( see notes)     Patient is accompanied by her mother  Patient Active Problem List    Diagnosis Date Noted    Pneumonia due to 2019 novel coronavirus 12/10/2024     Priority: Medium    Preeclampsia in postpartum period 06/18/2023     Priority: Medium    Encounter for induction of labor 06/13/2023     Priority: Medium    High-risk pregnancy in third trimester 06/13/2023     Priority: Medium    Chronic hypertension affecting pregnancy 06/13/2023     Priority: Medium    Obesity affecting pregnancy in third trimester 06/13/2023     Priority: Medium    PCOS (polycystic ovarian syndrome) 05/23/2018     Priority: Medium    Severe obstructive sleep apnea 05/23/2018     Priority: Medium     Setting: AutoPAP 8-15 cmH20  Supplied by: [facility]  PSG done: 2015 [place]  AHI 38  RDI -  Lowest O2 Sat: 88%      Contraception 03/16/2015     Priority: Medium    Morbid obesity (H) 11/18/2014     Priority: Medium    Anxiety 11/18/2014     Priority: Medium    CARDIOVASCULAR SCREENING; LDL GOAL LESS THAN 160 12/04/2013     Priority: Medium       Current Outpatient Medications   Medication Sig Dispense Refill    albuterol (PROAIR HFA/PROVENTIL HFA/VENTOLIN HFA) 108 (90 Base) MCG/ACT inhaler Inhale 2 puffs into the lungs every 6 hours as needed for shortness of breath, wheezing or cough. 18 g 0    omeprazole (PRILOSEC) 20 MG DR capsule Take 1 capsule (20 mg) by mouth daily. 30 capsule 0    ondansetron (ZOFRAN ODT) 4 MG ODT tab Take 1 tablet (4 mg) by mouth every 8 hours as needed. 30 tablet 0    sucralfate (CARAFATE) 1 GM tablet Take 1 tablet (1 g) by mouth 4 times daily for 10 days. 40 tablet 0     Current Facility-Administered Medications   Medication Dose Route Frequency Provider Last Rate Last Admin    sodium chloride (PF) 0.9% PF flush 3 mL  3  mL Intravenous q1 min prn    3 mL at 07/31/25 1225           Objective    BP (!) 151/96 (BP Location: Left arm, Patient Position: Chair, Cuff Size: Adult Large)   Pulse 74   Resp 18   Wt (!) 144.2 kg (318 lb)   LMP 07/06/2025 (Approximate)   SpO2 100%   BMI 51.33 kg/m    Physical Exam     As noted above and including:   GEN: normal mentation, NAD  Pulm: non-labored  CV: HDS  Gait: normal    Results for orders placed or performed during the hospital encounter of 07/31/25   CT Abdomen Pelvis w Contrast     Status: None    Narrative    EXAM: CT ABDOMEN PELVIS W CONTRAST  LOCATION: [facility]  DATE: 7/31/2025    INDICATION: Abd pain 2 weeks intermittent vomiting  COMPARISON: None.  TECHNIQUE: CT scan of the abdomen and pelvis was performed following injection of IV contrast. Multiplanar reformats were obtained. Dose reduction techniques were used.  CONTRAST: 100mL Isovue 370    FINDINGS:   LOWER CHEST: Normal.    HEPATOBILIARY: Normal.    PANCREAS: Normal.    SPLEEN: Normal.    ADRENAL GLANDS: Normal.    KIDNEYS/BLADDER: Normal.    BOWEL: Normal appendix. No obstruction. No acute inflammatory change. No free air or free fluid. No abscess identified.    LYMPH NODES: Normal.    VASCULATURE: Normal.    PELVIC ORGANS: Small pelvic fluid. No adnexal or uterine abnormality identified.    MUSCULOSKELETAL: Mild degenerative changes of the spine.      Impression    IMPRESSION:   1.  No acute abnormality identified.  2.  Small pelvic fluid could be physiologic.   Results for orders placed or performed in visit on 07/31/25   CRP inflammation     Status: Abnormal   Result Value Ref Range    CRP Inflammation 6.60 (H) <5.00 mg/L   Comprehensive metabolic panel     Status: Abnormal   Result Value Ref Range    Sodium 137 135 - 145 mmol/L    Potassium 3.8 3.4 - 5.3 mmol/L    Carbon Dioxide (CO2) 24 22 - 29 mmol/L    Anion Gap 13 7 - 15 mmol/L    Urea Nitrogen 6.5 6.0 - 20.0 mg/dL    Creatinine 1.00 (H) 0.51  - 0.95 mg/dL    GFR Estimate 74 >60 mL/min/1.73m2    Calcium 9.4 8.8 - 10.4 mg/dL    Chloride 100 98 - 107 mmol/L    Glucose 85 70 - 99 mg/dL    Alkaline Phosphatase 74 40 - 150 U/L    AST 21 0 - 45 U/L    ALT 33 0 - 50 U/L    Protein Total 7.3 6.4 - 8.3 g/dL    Albumin 4.4 3.5 - 5.2 g/dL    Bilirubin Total 0.6 <=1.2 mg/dL   Lipase     Status: Normal   Result Value Ref Range    Lipase 39 13 - 60 U/L   HCG Qual, Urine ([id number])     Status: Normal   Result Value Ref Range    hCG Urine Qualitative Negative Negative   CBC with platelets and differential     Status: None   Result Value Ref Range    WBC Count 7.2 4.0 - 11.0 10e3/uL    RBC Count 4.60 3.80 - 5.20 10e6/uL    Hemoglobin 13.3 11.7 - 15.7 g/dL    Hematocrit 40.4 35.0 - 47.0 %    MCV 88 78 - 100 fL    MCH 28.9 26.5 - 33.0 pg    MCHC 32.9 31.5 - 36.5 g/dL    RDW 13.2 10.0 - 15.0 %    Platelet Count 286 150 - 450 10e3/uL    % Neutrophils 68 %    % Lymphocytes 24 %    % Monocytes 7 %    % Eosinophils 1 %    % Basophils 1 %    % Immature Granulocytes 0 %    NRBCs per 100 WBC 0 <1 /100    Absolute Neutrophils 4.9 1.6 - 8.3 10e3/uL    Absolute Lymphocytes 1.7 0.8 - 5.3 10e3/uL    Absolute Monocytes 0.5 0.0 - 1.3 10e3/uL    Absolute Eosinophils 0.1 0.0 - 0.7 10e3/uL    Absolute Basophils 0.0 0.0 - 0.2 10e3/uL    Absolute Immature Granulocytes 0.0 <=0.4 10e3/uL    Absolute NRBCs 0.0 10e3/uL   CBC with platelets differential     Status: None    Narrative    The following orders were created for panel order CBC with platelets differential.  Procedure                               Abnormality         Status                     ---------                               -----------         ------                     CBC with platelets and ...[[id number]]                      Final result                 Please view results for these tests on the individual orders.       Estimated Creatinine Clearance: 112.3 mL/min (A) (based on SCr of 1 mg/dL (H)).                The use of  [employer] dictation services may have been used to construct the content in this note; any grammatical or spelling errors are non-intentional. Please contact the author of this note directly if you are in need of any clarification.

## 2025-08-06 ENCOUNTER — TELEPHONE (OUTPATIENT)
Dept: GASTROENTEROLOGY | Facility: CLINIC | Age: 40
End: 2025-08-06
Payer: COMMERCIAL

## 2025-08-21 ENCOUNTER — HOSPITAL ENCOUNTER (OUTPATIENT)
Facility: CLINIC | Age: 40
Discharge: HOME | End: 2025-08-21
Attending: INTERNAL MEDICINE | Admitting: INTERNAL MEDICINE
Payer: COMMERCIAL

## 2025-08-21 VITALS
RESPIRATION RATE: 16 BRPM | SYSTOLIC BLOOD PRESSURE: 129 MMHG | DIASTOLIC BLOOD PRESSURE: 65 MMHG | HEART RATE: 61 BPM | OXYGEN SATURATION: 99 %

## 2025-08-21 LAB — UPPER GI ENDOSCOPY: NORMAL

## 2025-08-21 PROCEDURE — 250N000009 HC RX 250: Performed by: INTERNAL MEDICINE

## 2025-08-21 PROCEDURE — 43239 EGD BIOPSY SINGLE/MULTIPLE: CPT | Performed by: INTERNAL MEDICINE

## 2025-08-21 PROCEDURE — 88305 TISSUE EXAM BY PATHOLOGIST: CPT | Mod: TC | Performed by: INTERNAL MEDICINE

## 2025-08-21 PROCEDURE — 250N000011 HC RX IP 250 OP 636: Performed by: INTERNAL MEDICINE

## 2025-08-21 RX ORDER — FENTANYL CITRATE 50 UG/ML
INJECTION, SOLUTION INTRAMUSCULAR; INTRAVENOUS PRN
Status: DISCONTINUED | OUTPATIENT
Start: 2025-08-21 | End: 2025-08-21 | Stop reason: HOSPADM

## 2025-08-21 ASSESSMENT — ACTIVITIES OF DAILY LIVING (ADL)
ADLS_ACUITY_SCORE: 46
ADLS_ACUITY_SCORE: 46

## 2025-08-22 LAB
PATH REPORT.COMMENTS IMP SPEC: NORMAL
PATH REPORT.COMMENTS IMP SPEC: NORMAL
PATH REPORT.FINAL DX SPEC: NORMAL
PATH REPORT.GROSS SPEC: NORMAL
PATH REPORT.MICROSCOPIC SPEC OTHER STN: NORMAL
PATH REPORT.RELEVANT HX SPEC: NORMAL
PHOTO IMAGE: NORMAL

## (undated) RX ORDER — FENTANYL CITRATE 50 UG/ML
INJECTION, SOLUTION INTRAMUSCULAR; INTRAVENOUS
Status: DISPENSED
Start: 2025-08-21